# Patient Record
Sex: FEMALE | Race: WHITE | Employment: PART TIME
[De-identification: names, ages, dates, MRNs, and addresses within clinical notes are randomized per-mention and may not be internally consistent; named-entity substitution may affect disease eponyms.]

---
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
## 2019-01-03 ENCOUNTER — TELEPHONE (OUTPATIENT)
Dept: CARDIOLOGY | Age: 19
End: 2019-01-03

## 2019-02-21 PROCEDURE — 93298 REM INTERROG DEV EVAL SCRMS: CPT | Performed by: FAMILY MEDICINE

## 2019-02-27 ENCOUNTER — TELEPHONE (OUTPATIENT)
Dept: CARDIOLOGY | Age: 19
End: 2019-02-27

## 2019-02-28 ENCOUNTER — NURSE ONLY (OUTPATIENT)
Dept: CARDIOLOGY | Age: 19
End: 2019-02-28
Payer: COMMERCIAL

## 2019-02-28 DIAGNOSIS — R00.0 INAPPROPRIATE SINUS TACHYCARDIA: ICD-10-CM

## 2019-02-28 DIAGNOSIS — Z45.09 ENCOUNTER FOR LOOP RECORDER CHECK: Primary | ICD-10-CM

## 2019-03-08 ENCOUNTER — TELEPHONE (OUTPATIENT)
Dept: CARDIOLOGY | Age: 19
End: 2019-03-08

## 2019-05-28 PROCEDURE — 93299 PR REM INTERROG ICPMS/SCRMS <30 D TECH REVIEW: CPT | Performed by: FAMILY MEDICINE

## 2019-05-28 PROCEDURE — 93298 REM INTERROG DEV EVAL SCRMS: CPT | Performed by: FAMILY MEDICINE

## 2019-05-29 ENCOUNTER — TELEPHONE (OUTPATIENT)
Dept: CARDIOLOGY | Age: 19
End: 2019-05-29

## 2019-06-11 ENCOUNTER — NURSE ONLY (OUTPATIENT)
Dept: CARDIOLOGY | Age: 19
End: 2019-06-11
Payer: COMMERCIAL

## 2019-06-11 DIAGNOSIS — I47.20 VENTRICULAR TACHYCARDIA: ICD-10-CM

## 2019-06-11 DIAGNOSIS — Z45.09 ENCOUNTER FOR LOOP RECORDER CHECK: Primary | ICD-10-CM

## 2019-06-26 DIAGNOSIS — N92.0 MENORRHAGIA WITH REGULAR CYCLE: ICD-10-CM

## 2019-07-19 ENCOUNTER — OFFICE VISIT (OUTPATIENT)
Dept: CARDIOLOGY | Age: 19
End: 2019-07-19
Payer: COMMERCIAL

## 2019-07-19 VITALS
WEIGHT: 153 LBS | HEIGHT: 63 IN | HEART RATE: 98 BPM | OXYGEN SATURATION: 97 % | RESPIRATION RATE: 18 BRPM | BODY MASS INDEX: 27.11 KG/M2 | SYSTOLIC BLOOD PRESSURE: 127 MMHG | DIASTOLIC BLOOD PRESSURE: 81 MMHG

## 2019-07-19 DIAGNOSIS — I47.29 PVT (PAROXYSMAL VENTRICULAR TACHYCARDIA): ICD-10-CM

## 2019-07-19 DIAGNOSIS — G90.A POTS (POSTURAL ORTHOSTATIC TACHYCARDIA SYNDROME): Primary | ICD-10-CM

## 2019-07-19 DIAGNOSIS — Z98.890 HISTORY OF LOOP RECORDER: ICD-10-CM

## 2019-07-19 PROCEDURE — 93000 ELECTROCARDIOGRAM COMPLETE: CPT | Performed by: FAMILY MEDICINE

## 2019-07-19 PROCEDURE — 99213 OFFICE O/P EST LOW 20 MIN: CPT | Performed by: FAMILY MEDICINE

## 2019-07-19 NOTE — PROGRESS NOTES
Danii Malcolm am scribing for and in the presence of Rosalva Pollen. Judith Redmond MD, MS, F.A.C.C..    Patient: Benji Steinberg  : 2000  Date of Visit: 2019    REASON FOR VISIT / CONSULTATION: POTS, 1 Year Follow Up (HX: POTS, Tachy, NSVT, Loop. Pt states she is doing good. Denies: CP, Palpitaotns, lighetaded/dizziness, SOB. )    Dear Suad Jimenez MD,    I had the pleasure of seeing your patient Benji Steinberg today in follow up. As you know, Ms. Milena Cormier is a 22 y.o. female with a history of NSVT as well as neurocardiogenic dysfunction with a history of syncope leading to placement of an implantable loop recorder placement on 3/22/16. She also has a history of postural orthostatic tachycardia syndrome (POTS) that had been treated with Toprol XL 25 mg daily. Because of some symptoms of fatigue she decided to go off her the beta-blocker and had been doing fairly well. Ms. Milena Cormier is here for follow up. She states she is doing well at this time. She does report she has had a few possible panic attacks she felt her heart racing, also like she was going to pass out she notices it before a big exam but once she relaxed and calmed down she felt better after 30 minutes or so. She denies any current or recent chest pain, shortness of breath, abdominal pain, diarrhea or bleeding problems. She denies any recent falls or near falls. Past Medical History:   Diagnosis Date    H/O echocardiogram 16    H/O echocardiogram 1/15/16    Moss treadmill score is 15, which correlates with a low risk for CAD. Overall these results are most consistent with a low risk for significant CAD.  Migraines 2001    Usually right before menstural cycle    Neurocardiogenic syncope 2016    Status post placement of implantable loop recorder 3/22/16    LINQ Implant-Medtronic     VT (ventricular tachycardia) ([facility] [address] 2016       CURRENT ALLERGIES: Sulfa antibiotics REVIEW OF SYSTEMS: 14 systems were reviewed.  Pertinent are normal. She exhibits no organomegaly, mass or bruit. Extremities: None. No cyanosis or clubbing. 2+ radial and carotid pulses. Distal extremity pulses: 2+ bilaterally. Neurological: She is alert and oriented to person, place, and time. No evidence of gross cranial nerve deficit. Coordination appeared normal.   Skin: Skin is warm and dry. There is no rash or diaphoresis. Psychiatric: She has a normal mood and affect. Her speech is normal and behavior is normal.      MOST RECENT LABS ON RECORD:   Lab Results   Component Value Date    WBC 7.1 10/04/2016    HGB 14.7 10/04/2016    HCT 42.5 10/04/2016     10/04/2016    ALT 9 09/18/2015    AST 14 09/18/2015     10/04/2016    K 4.6 10/04/2016     10/04/2016    CREATININE 0.73 10/04/2016    BUN 10 10/04/2016    CO2 27 10/04/2016    TSH 1.90 01/05/2016       ASSESSMENT:  1. POTS (postural orthostatic tachycardia syndrome)    2. PVT (paroxysmal ventricular tachycardia) ([facility] [address]    3. History of loop recorder       PLAN:  Postural Orthostatic Tachycardia Syndrome (POTS): Currently only mildly intermittently Symptomatic   · Pharmacological Management: Not indicated at this time. · Nonpharmacologic counseling: Because of her condition, I reminded her to try and keep herself well-hydrated and to take extra time when moving from laying to sitting, sitting to standing and standing to walking and not to avoid salt in her diet. · Additional Testing: No testing intervention needed at this time. Paroxysmal Ventricular Tachycardia: Asymptomatic. No recurrence on her loop recorder since implantation. · Additional Testing: None   · EKG done today in office      Implantable Loop Recorder Implanted 3/22/2016  Indication for Device Placement: History of a wide complex tachycardia as well as recurrent pre-syncope   Interrogation Findings: Relatively frequent intermittent tachycardia but largely asymptomatic and with exercise.  Continue to monitor from home every

## 2019-08-20 PROCEDURE — 93299 PR REM INTERROG ICPMS/SCRMS <30 D TECH REVIEW: CPT | Performed by: FAMILY MEDICINE

## 2019-08-20 PROCEDURE — 93298 REM INTERROG DEV EVAL SCRMS: CPT | Performed by: FAMILY MEDICINE

## 2019-08-22 ENCOUNTER — TELEPHONE (OUTPATIENT)
Dept: CARDIOLOGY | Age: 19
End: 2019-08-22

## 2019-08-23 ENCOUNTER — NURSE ONLY (OUTPATIENT)
Dept: CARDIOLOGY | Age: 19
End: 2019-08-23
Payer: COMMERCIAL

## 2019-08-23 DIAGNOSIS — I47.29 PVT (PAROXYSMAL VENTRICULAR TACHYCARDIA): ICD-10-CM

## 2019-08-23 DIAGNOSIS — Z45.09 ENCOUNTER FOR LOOP RECORDER CHECK: ICD-10-CM

## 2019-11-14 ENCOUNTER — TELEPHONE (OUTPATIENT)
Dept: CARDIOLOGY | Age: 19
End: 2019-11-14

## 2020-01-26 RX ORDER — ONDANSETRON 4 MG/1
4 TABLET, ORALLY DISINTEGRATING ORAL 3 TIMES DAILY PRN
Qty: 15 TABLET | Refills: 0 | Status: SHIPPED | OUTPATIENT
Start: 2020-01-26 | End: 2022-05-09

## 2020-01-27 ENCOUNTER — APPOINTMENT (OUTPATIENT)
Dept: GENERAL RADIOLOGY | Age: 20
End: 2020-01-27
Attending: FAMILY MEDICINE
Payer: COMMERCIAL

## 2020-01-27 ENCOUNTER — HOSPITAL ENCOUNTER (OUTPATIENT)
Age: 20
Setting detail: OBSERVATION
Discharge: HOME | End: 2020-01-28
Attending: FAMILY MEDICINE | Admitting: FAMILY MEDICINE
Payer: COMMERCIAL

## 2020-01-27 PROBLEM — E86.0 DEHYDRATION: Status: ACTIVE | Noted: 2020-01-27

## 2020-01-27 PROCEDURE — G0378 HOSPITAL OBSERVATION PER HR: HCPCS

## 2020-01-27 PROCEDURE — 87040 BLOOD CULTURE FOR BACTERIA: CPT

## 2020-01-27 PROCEDURE — 81001 URINALYSIS AUTO W/SCOPE: CPT

## 2020-01-27 PROCEDURE — 6370000000 HC RX 637 (ALT 250 FOR IP): Performed by: FAMILY MEDICINE

## 2020-01-27 PROCEDURE — 96360 HYDRATION IV INFUSION INIT: CPT

## 2020-01-27 PROCEDURE — 93005 ELECTROCARDIOGRAM TRACING: CPT | Performed by: FAMILY MEDICINE

## 2020-01-27 PROCEDURE — 2580000003 HC RX 258: Performed by: FAMILY MEDICINE

## 2020-01-27 PROCEDURE — G0379 DIRECT REFER HOSPITAL OBSERV: HCPCS

## 2020-01-27 PROCEDURE — 71046 X-RAY EXAM CHEST 2 VIEWS: CPT

## 2020-01-27 PROCEDURE — 36415 COLL VENOUS BLD VENIPUNCTURE: CPT

## 2020-01-27 RX ORDER — SODIUM CHLORIDE 0.9 % (FLUSH) 0.9 %
10 SYRINGE (ML) INJECTION PRN
Status: DISCONTINUED | OUTPATIENT
Start: 2020-01-27 | End: 2020-01-28 | Stop reason: HOSPADM

## 2020-01-27 RX ORDER — ACETAMINOPHEN 325 MG/1
650 TABLET ORAL EVERY 4 HOURS PRN
Status: DISCONTINUED | OUTPATIENT
Start: 2020-01-27 | End: 2020-01-28 | Stop reason: HOSPADM

## 2020-01-27 RX ORDER — SODIUM CHLORIDE 9 MG/ML
INJECTION, SOLUTION INTRAVENOUS CONTINUOUS
Status: DISCONTINUED | OUTPATIENT
Start: 2020-01-27 | End: 2020-01-28 | Stop reason: HOSPADM

## 2020-01-27 RX ORDER — ONDANSETRON 2 MG/ML
4 INJECTION INTRAMUSCULAR; INTRAVENOUS EVERY 6 HOURS PRN
Status: DISCONTINUED | OUTPATIENT
Start: 2020-01-27 | End: 2020-01-28 | Stop reason: HOSPADM

## 2020-01-27 RX ORDER — FAMOTIDINE 10 MG
20 TABLET ORAL 2 TIMES DAILY
Status: DISCONTINUED | OUTPATIENT
Start: 2020-01-27 | End: 2020-01-28 | Stop reason: HOSPADM

## 2020-01-27 RX ORDER — SODIUM CHLORIDE 0.9 % (FLUSH) 0.9 %
10 SYRINGE (ML) INJECTION EVERY 12 HOURS SCHEDULED
Status: DISCONTINUED | OUTPATIENT
Start: 2020-01-27 | End: 2020-01-28 | Stop reason: HOSPADM

## 2020-01-27 RX ADMIN — SODIUM CHLORIDE: 9 INJECTION, SOLUTION INTRAVENOUS at 23:13

## 2020-01-27 RX ADMIN — ACETAMINOPHEN 650 MG: 325 TABLET, FILM COATED ORAL at 22:54

## 2020-01-27 RX ADMIN — BENZOCAINE AND MENTHOL 1 LOZENGE: 15; 3.6 LOZENGE ORAL at 22:54

## 2020-01-27 RX ADMIN — FAMOTIDINE 20 MG: 10 TABLET ORAL at 22:54

## 2020-01-27 ASSESSMENT — PAIN SCALES - GENERAL
PAINLEVEL_OUTOF10: 5
PAINLEVEL_OUTOF10: 5
PAINLEVEL_OUTOF10: 4

## 2020-01-28 VITALS
DIASTOLIC BLOOD PRESSURE: 69 MMHG | SYSTOLIC BLOOD PRESSURE: 124 MMHG | WEIGHT: 147.7 LBS | OXYGEN SATURATION: 96 % | HEART RATE: 102 BPM | RESPIRATION RATE: 16 BRPM | BODY MASS INDEX: 26.17 KG/M2 | TEMPERATURE: 99.1 F | HEIGHT: 63 IN

## 2020-01-28 PROBLEM — J10.1 INFLUENZA A: Status: ACTIVE | Noted: 2020-01-28

## 2020-01-28 LAB
-: ABNORMAL
ALBUMIN SERPL-MCNC: 3.5 G/DL (ref 3.5–5.2)
ALBUMIN SERPL-MCNC: 4.4 G/DL (ref 3.5–5.2)
ALBUMIN/GLOBULIN RATIO: 1.1 (ref 1–2.5)
ALBUMIN/GLOBULIN RATIO: 1.2 (ref 1–2.5)
ALP BLD-CCNC: 53 U/L (ref 35–104)
ALP BLD-CCNC: 68 U/L (ref 35–104)
ALT SERPL-CCNC: 12 U/L (ref 5–33)
ALT SERPL-CCNC: 8 U/L (ref 5–33)
AMORPHOUS: ABNORMAL
ANION GAP SERPL CALCULATED.3IONS-SCNC: 14 MMOL/L (ref 9–17)
ANION GAP SERPL CALCULATED.3IONS-SCNC: 16 MMOL/L (ref 9–17)
AST SERPL-CCNC: 14 U/L
AST SERPL-CCNC: 25 U/L
BACTERIA: ABNORMAL
BILIRUB SERPL-MCNC: 0.26 MG/DL (ref 0.3–1.2)
BILIRUB SERPL-MCNC: 0.29 MG/DL (ref 0.3–1.2)
BILIRUBIN URINE: NEGATIVE
BUN BLDV-MCNC: 7 MG/DL (ref 6–20)
BUN BLDV-MCNC: 9 MG/DL (ref 6–20)
BUN/CREAT BLD: 11 (ref 9–20)
BUN/CREAT BLD: 12 (ref 9–20)
CALCIUM SERPL-MCNC: 8.3 MG/DL (ref 8.6–10.4)
CALCIUM SERPL-MCNC: 9.5 MG/DL (ref 8.6–10.4)
CASTS UA: ABNORMAL /LPF
CHLORIDE BLD-SCNC: 104 MMOL/L (ref 98–107)
CHLORIDE BLD-SCNC: 95 MMOL/L (ref 98–107)
CO2: 21 MMOL/L (ref 20–31)
CO2: 22 MMOL/L (ref 20–31)
COLOR: YELLOW
COMMENT UA: ABNORMAL
CREAT SERPL-MCNC: 0.66 MG/DL (ref 0.5–0.9)
CREAT SERPL-MCNC: 0.78 MG/DL (ref 0.5–0.9)
CRYSTALS, UA: ABNORMAL /HPF
DIRECT EXAM: ABNORMAL
DIRECT EXAM: ABNORMAL
EKG ATRIAL RATE: 126 BPM
EKG P AXIS: 54 DEGREES
EKG P-R INTERVAL: 122 MS
EKG Q-T INTERVAL: 290 MS
EKG QRS DURATION: 82 MS
EKG QTC CALCULATION (BAZETT): 420 MS
EKG R AXIS: 101 DEGREES
EKG T AXIS: 31 DEGREES
EKG VENTRICULAR RATE: 126 BPM
EPITHELIAL CELLS UA: ABNORMAL /HPF (ref 0–25)
GFR AFRICAN AMERICAN: >60 ML/MIN
GFR AFRICAN AMERICAN: >60 ML/MIN
GFR NON-AFRICAN AMERICAN: >60 ML/MIN
GFR NON-AFRICAN AMERICAN: >60 ML/MIN
GFR SERPL CREATININE-BSD FRML MDRD: ABNORMAL ML/MIN/{1.73_M2}
GLUCOSE BLD-MCNC: 110 MG/DL (ref 70–99)
GLUCOSE BLD-MCNC: 94 MG/DL (ref 70–99)
GLUCOSE URINE: NEGATIVE
HCT VFR BLD CALC: 34.9 % (ref 36.3–47.1)
HCT VFR BLD CALC: 39.8 % (ref 36.3–47.1)
HEMOGLOBIN: 11.4 G/DL (ref 11.9–15.1)
HEMOGLOBIN: 13.3 G/DL (ref 11.9–15.1)
KETONES, URINE: ABNORMAL
LEUKOCYTE ESTERASE, URINE: NEGATIVE
Lab: ABNORMAL
MCH RBC QN AUTO: 29.5 PG (ref 25.2–33.5)
MCH RBC QN AUTO: 29.7 PG (ref 25.2–33.5)
MCHC RBC AUTO-ENTMCNC: 32.7 G/DL (ref 28.4–34.8)
MCHC RBC AUTO-ENTMCNC: 33.4 G/DL (ref 28.4–34.8)
MCV RBC AUTO: 88.8 FL (ref 82.6–102.9)
MCV RBC AUTO: 90.4 FL (ref 82.6–102.9)
MUCUS: ABNORMAL
NITRITE, URINE: NEGATIVE
NRBC AUTOMATED: 0 PER 100 WBC
NRBC AUTOMATED: 0 PER 100 WBC
OTHER OBSERVATIONS UA: ABNORMAL
PDW BLD-RTO: 13 % (ref 11.8–14.4)
PDW BLD-RTO: 13.1 % (ref 11.8–14.4)
PH UA: 6.5 (ref 5–9)
PLATELET # BLD: 188 K/UL (ref 138–453)
PLATELET # BLD: 232 K/UL (ref 138–453)
PMV BLD AUTO: 11.2 FL (ref 8.1–13.5)
PMV BLD AUTO: 11.8 FL (ref 8.1–13.5)
POTASSIUM SERPL-SCNC: 3.6 MMOL/L (ref 3.7–5.3)
POTASSIUM SERPL-SCNC: 3.9 MMOL/L (ref 3.7–5.3)
PROTEIN UA: ABNORMAL
RBC # BLD: 3.86 M/UL (ref 3.95–5.11)
RBC # BLD: 4.48 M/UL (ref 3.95–5.11)
RBC UA: ABNORMAL /HPF (ref 0–2)
RENAL EPITHELIAL, UA: ABNORMAL /HPF
SODIUM BLD-SCNC: 133 MMOL/L (ref 135–144)
SODIUM BLD-SCNC: 139 MMOL/L (ref 135–144)
SPECIFIC GRAVITY UA: 1.01 (ref 1.01–1.02)
SPECIMEN DESCRIPTION: ABNORMAL
TOTAL PROTEIN: 6.6 G/DL (ref 6.4–8.3)
TOTAL PROTEIN: 8.1 G/DL (ref 6.4–8.3)
TRICHOMONAS: ABNORMAL
TURBIDITY: CLEAR
URINE HGB: ABNORMAL
UROBILINOGEN, URINE: NORMAL
WBC # BLD: 12.3 K/UL (ref 4.5–13.5)
WBC # BLD: 7.8 K/UL (ref 4.5–13.5)
WBC UA: ABNORMAL /HPF (ref 0–5)
YEAST: ABNORMAL

## 2020-01-28 PROCEDURE — 87804 INFLUENZA ASSAY W/OPTIC: CPT

## 2020-01-28 PROCEDURE — 85027 COMPLETE CBC AUTOMATED: CPT

## 2020-01-28 PROCEDURE — 2580000003 HC RX 258: Performed by: FAMILY MEDICINE

## 2020-01-28 PROCEDURE — 96361 HYDRATE IV INFUSION ADD-ON: CPT

## 2020-01-28 PROCEDURE — 80053 COMPREHEN METABOLIC PANEL: CPT

## 2020-01-28 PROCEDURE — 36415 COLL VENOUS BLD VENIPUNCTURE: CPT

## 2020-01-28 PROCEDURE — 99219 PR INITIAL OBSERVATION CARE/DAY 50 MINUTES: CPT | Performed by: FAMILY MEDICINE

## 2020-01-28 PROCEDURE — 93010 ELECTROCARDIOGRAM REPORT: CPT | Performed by: INTERNAL MEDICINE

## 2020-01-28 PROCEDURE — G0378 HOSPITAL OBSERVATION PER HR: HCPCS

## 2020-01-28 PROCEDURE — 6370000000 HC RX 637 (ALT 250 FOR IP): Performed by: FAMILY MEDICINE

## 2020-01-28 RX ORDER — OSELTAMIVIR PHOSPHATE 75 MG/1
75 CAPSULE ORAL 2 TIMES DAILY
Status: DISCONTINUED | OUTPATIENT
Start: 2020-01-28 | End: 2020-01-28 | Stop reason: HOSPADM

## 2020-01-28 RX ORDER — OSELTAMIVIR PHOSPHATE 75 MG/1
75 CAPSULE ORAL 2 TIMES DAILY
Qty: 20 CAPSULE | Refills: 0 | Status: SHIPPED | OUTPATIENT
Start: 2020-01-28 | End: 2020-02-07

## 2020-01-28 RX ADMIN — ACETAMINOPHEN 650 MG: 325 TABLET, FILM COATED ORAL at 02:30

## 2020-01-28 RX ADMIN — SODIUM CHLORIDE: 9 INJECTION, SOLUTION INTRAVENOUS at 06:26

## 2020-01-28 RX ADMIN — FAMOTIDINE 20 MG: 10 TABLET ORAL at 08:46

## 2020-01-28 RX ADMIN — OSELTAMIVIR PHOSPHATE 75 MG: 75 CAPSULE ORAL at 08:59

## 2020-01-28 RX ADMIN — BENZOCAINE AND MENTHOL 1 LOZENGE: 15; 3.6 LOZENGE ORAL at 02:33

## 2020-01-28 RX ADMIN — ACETAMINOPHEN 650 MG: 325 TABLET, FILM COATED ORAL at 08:46

## 2020-01-28 RX ADMIN — SODIUM CHLORIDE: 9 INJECTION, SOLUTION INTRAVENOUS at 02:26

## 2020-01-28 RX ADMIN — ACETAMINOPHEN 650 MG: 325 TABLET, FILM COATED ORAL at 12:50

## 2020-01-28 RX ADMIN — SODIUM CHLORIDE: 9 INJECTION, SOLUTION INTRAVENOUS at 10:33

## 2020-01-28 ASSESSMENT — PAIN SCALES - GENERAL
PAINLEVEL_OUTOF10: 1
PAINLEVEL_OUTOF10: 3
PAINLEVEL_OUTOF10: 2
PAINLEVEL_OUTOF10: 3
PAINLEVEL_OUTOF10: 0
PAINLEVEL_OUTOF10: 0

## 2020-01-28 NOTE — DISCHARGE INSTR - DIET

## 2020-01-28 NOTE — PROGRESS NOTES
Discharge instructions given to pt and mother. No questions, pt verbalized understanding all instructions. Pt aware to make follow up appointment as needed. Pt d/c'd off unit at this time, ambulatory with mother to home. Belongings in hand. No issues noted.

## 2020-01-28 NOTE — PROGRESS NOTES
Dr. Elke Guzman contacted for new orders and made aware of MEW, new orders received, blood cultues and blood draw preformed, IV fluids started, medication administered as ordered, pt assessment preformed, oriented to room

## 2020-01-28 NOTE — PROGRESS NOTES
Call placed to Dr. Amina Barajas office to verify when pt may return to college. Spoke with Nelsy Frausto, she informs that Dr. Neeraj Norton is okay with pt returning to college when she feels able but must wear a mask until her fevers are completely resolved. Pt made aware. Pt again ask if she would like to receive Flu Vacc and she states \"no, I don't think so. \"

## 2020-01-28 NOTE — H&P
Hospital Medicine  History and Physical    Patient:  Sana Guzman  MRN: [id number]    Chief Complaint:  Vomiting and cough    History Obtained From:  patient, mother  PCP: Boris Ruiz MD    History of Present Illness: The patient is a 20 y.o. 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 Overall these results are most consistent with a low risk for significant CAD.  Migraines 2001    Usually right before menstural cycle    Neurocardiogenic syncope 1/01/2016    Status post placement of implantable loop recorder 3/22/16    LINQ Implant-Medtronic     VT (ventricular tachycardia) ([facility] [address] 1/01/2016       Past Surgical History:        Procedure Laterality Date    INSERTABLE CARDIAC MONITOR Left 3/10/16    [id number]/ Dr. Carolann Salomon  03/22/2016    Replacement of LINQ loop recorder secondary to trumatic injury/Dr. Valladares       Medications Prior to Admission:    Prior to Admission medications    Medication Sig Start Date End Date Taking?  Authorizing Provider   ondansetron (ZOFRAN-ODT) 4 MG disintegrating tablet Take 1 tablet by mouth 3 times daily as needed for Nausea or Vomiting 1/26/20  Yes Boris Ruiz MD   SPRINTEC 28 0.25-35 MG-MCG per tablet TAKE 1 TABLET BY MOUTH ONCE DAILY faint wheeze  good air movement  ABD:    Bowels sounds normal.  Abdomen is soft. No distention. No tenderness. EXT:   no edema bilaterally . No calf tenderness. NEURO: Motor and sensory are intact  SKIN:  No rashes. No skin lesions. PSYCH:  Normal mood and affect  -----------------------------------------------------------------  Diagnostic Data:   · All diagnostic data was reviewed    Assessment:         Active Problems:    Dehydration  Resolved Problems:    * No resolved hospital problems.  *      Plan:     · This patient requires overnight observation because of likely influenza  Dehydration and tachycardia  · Factors affecting the medical complexity of this patient include past history of v tach    · Estimated length of stay is 1 days  · Will swab for influenza  She has received 2 liters fluid thus far  Lab is not revealing  cxr is clear  Urine is clear  Clear liquids  Discharge later today    Barbara Manriquez MD

## 2020-01-28 NOTE — PROGRESS NOTES
Met with mother to discuss Patient discharge plan. Patient is a 20year old single, white female, admitted with diagnosis of Dehydration. She is noted to be alert and oriented, due to be discharged later today. Patient is a college student at [employer]. She uses no DME and has no outside services or community resources currently in place. Patient is independent with ADL's. PCP is Dr. Dayana Burnett. Patient is still under her father's medical insurance and mother also assists with the out of pocket cost for her prescriptions. Discharge plan is home when stable. Patient has no medical directives currently in place. No anticipated needs or discharge concerns noted by Patient's mother at this time. Will assist with needs as appropriate.     [address]  1/28/2020

## 2020-01-29 NOTE — DISCHARGE SUMMARY
Discharge Summary    Shannan Miranda  :  2000  MRN:  [id number]    Admit date:  2020      Discharge date: 2020     Admitting Physician:  Pratibha Sanders MD    Consultants:  none    Procedures: none    Complications: none    Discharge Condition: fair    Hospital Course:   Shannan Miranda is a 20 y.o. female admitted with vomiting and dehydration  She was noted to have influenza A on admission testing  She recovered with hydration and able to tolerate liquids  She was started on tamiflu  She was stable for discharge     Exam:  GEN:  alert and oriented to person, place and time, well-developed and well-nourished, in no acute distress  EYES: No gross abnormalities.   NECK: normal,  no carotid bruits  PULM: clear to auscultation bilaterally- no wheezes, rales or rhonchi, normal air movement, no respiratory distress  COR: regular rate & rhythm  ABD:  soft, non-tender, non-distended, normal bowel sounds, no masses or organomegaly  EXT:   no cyanosis, clubbing or edema present    NEURO: negative, follows commands, MCFADDEN, no deficits  SKIN:  no rashes or significant lesions    Significant Diagnostic Studies:   Lab Results   Component Value Date    WBC 7.8 2020    HGB 11.4 (L) 2020     2020       Lab Results   Component Value Date    BUN 7 2020    CREATININE 0.66 2020     2020    K 3.6 (L) 2020    CALCIUM 8.3 (L) 2020     2020    CO2 21 2020    LABGLOM >60 2020       Lab Results   Component Value Date    WBCUA 0 TO 2 2020    RBCUA 0 TO 2 2020    EPITHUA 2 TO 5 2020    LEUKOCYTESUR NEGATIVE 2020    SPECGRAV 1.010 2020    GLUCOSEU NEGATIVE 2020    KETUA 3+ (A) 2020    PROTEINU TRACE (A) 2020    HGBUR TRACE (A) 2020    CASTUA NOT REPORTED 2020    CRYSTUA NOT REPORTED 2020    BACTERIA TRACE (A) 2020    YEAST NOT REPORTED 2020       Xr Chest Standard (2

## 2020-02-01 LAB
CULTURE: NORMAL
CULTURE: NORMAL
Lab: NORMAL
Lab: NORMAL
SPECIMEN DESCRIPTION: NORMAL
SPECIMEN DESCRIPTION: NORMAL

## 2020-02-11 RX ORDER — BENZONATATE 100 MG/1
100 CAPSULE ORAL 2 TIMES DAILY PRN
Qty: 20 CAPSULE | Refills: 0 | Status: SHIPPED | OUTPATIENT
Start: 2020-02-11 | End: 2020-02-18

## 2020-02-11 RX ORDER — METHYLPREDNISOLONE 4 MG/1
TABLET ORAL
Qty: 1 KIT | Refills: 0 | Status: SHIPPED | OUTPATIENT
Start: 2020-02-11 | End: 2020-02-17

## 2020-02-26 PROBLEM — E86.0 DEHYDRATION: Status: RESOLVED | Noted: 2020-01-27 | Resolved: 2020-02-26

## 2020-02-27 PROBLEM — J10.1 INFLUENZA A: Status: RESOLVED | Noted: 2020-01-28 | Resolved: 2020-02-27

## 2020-06-29 RX ORDER — NORGESTIMATE AND ETHINYL ESTRADIOL 0.25-0.035
KIT ORAL
Qty: 28 TABLET | Refills: 12 | Status: SHIPPED | OUTPATIENT
Start: 2020-06-29 | End: 2021-05-06

## 2020-07-14 ENCOUNTER — OFFICE VISIT (OUTPATIENT)
Dept: OBGYN | Age: 20
End: 2020-07-14
Payer: COMMERCIAL

## 2020-07-14 VITALS
HEIGHT: 63 IN | BODY MASS INDEX: 27.46 KG/M2 | DIASTOLIC BLOOD PRESSURE: 80 MMHG | SYSTOLIC BLOOD PRESSURE: 122 MMHG | WEIGHT: 155 LBS

## 2020-07-14 PROCEDURE — 99212 OFFICE O/P EST SF 10 MIN: CPT | Performed by: ADVANCED PRACTICE MIDWIFE

## 2020-07-14 NOTE — PROGRESS NOTES
PROBLEM VISIT     Date of service: 2020    Kevin Quezada  Is a 20 y.o. single female    PT's PCP is: Natalia Grant MD     : 2000                                             Subjective:       Patient's last menstrual period was 2020 (approximate). OB History    Para Term  AB Living   0 0 0 0 0 0   SAB TAB Ectopic Molar Multiple Live Births   0 0 0 0 0 0        Social History     Tobacco Use   Smoking Status Never Smoker   Smokeless Tobacco Never Used        Social History     Substance and Sexual Activity   Alcohol Use No    Alcohol/week: 0.0 standard drinks       Social History     Substance and Sexual Activity   Sexual Activity Yes    Partners: Male    Birth control/protection: Pill    Comment: pill, condoms       Allergies: Sulfa antibiotics    Chief Complaint   Patient presents with    Menorrhagia     Med check. Sprintec. pt states this has already been refilled . she likes this medication. pt states no issues or concerns. pt declines std testing        Last Yearly:      Last pap: n/a    Last HPV: n/a    PE:  Vital Signs  Blood pressure 122/80, height 5' 3\" (1.6 m), weight 155 lb (70.3 kg), last menstrual period 2020, not currently breastfeeding. NURSE: WILLIAM    HPI: Patient doing well today. Patient denies needs or concerns at this point in time and states that her medication is working well for her periods. Patient states she does have some spotting prior to the period but this does not affect her or bother her    Yes  PT denies fever, chills, nausea and vomiting       Assessment and Plan          Diagnosis Orders   1. Menorrhagia with regular cycle               I am having Ermelinda Handley maintain her ondansetron, benzocaine-menthol, and norgestimate-ethinyl estradiol. Return in about 3 months (around 10/19/2020) for yearly.     Patient Instructions     Patient Education        Pap Test: Care Instructions  Your Care Instructions     The Pap test (also called a Pap smear) is a screening test for cancer of the cervix, which is the lower part of the uterus that opens into the vagina. The test can help your doctor find early changes in the cells that could lead to cancer. The sample of cells taken during your test has been sent to a lab so that an expert can look at the cells. It usually takes a week or two to get the results back. Follow-up care is a key part of your treatment and safety. Be sure to make and go to all appointments, and call your doctor if you are having problems. It's also a good idea to know your test results and keep a list of the medicines you take. What do the results mean? · A normal result means that the test did not find any abnormal cells in the sample. · An abnormal result can mean many things. Most of these are not cancer. The results of your test may be abnormal because:  ? You have an infection of the vagina or cervix, such as a yeast infection. ? You have an IUD (intrauterine device for birth control). ? You have low estrogen levels after menopause that are causing the cells to change. ? You have cell changes that may be a sign of precancer or cancer. The results are ranked based on how serious the changes might be. There are many other reasons why you might not get a normal result. If the results were abnormal, you may need to get another test within a few weeks or months. If the results show changes that could be a sign of cancer, you may need a test called a colposcopy, which provides a more complete view of the cervix. Sometimes the lab cannot use the sample because it does not contain enough cells or was not preserved well. If so, you may need to have the test again. This is not common, but it does happen from time to time. When should you call for help?   Watch closely for changes in your health, and be sure to contact your doctor if:  · You have vaginal bleeding or pain for more than 2 days after the test. It is normal to have a small amount of bleeding for a day or two after the test.  Where can you learn more? Go to [email or web address] and sign in to your [employer] account. Enter R246 in the [facility] box to learn more about \"Pap Test: Care Instructions. \"     If you do not have an account, please click on the \"Sign Up Now\" link. Current as of: August 22, 2019               Content Version: 12.5  © 4971-4579 [employer]. Care instructions adapted under license by [facility] If you have questions about a medical condition or this instruction, always ask your healthcare professional. [place] [address] any warranty or liability for your use of this information. Patient Education        Well Visit, Ages 25 to 48: Care Instructions  Your Care Instructions     Physical exams can help you stay healthy. Your doctor has checked your overall health and may have suggested ways to take good care of yourself. He or she also may have recommended tests. At home, you can help prevent illness with healthy eating, regular exercise, and other steps. Follow-up care is a key part of your treatment and safety. Be sure to make and go to all appointments, and call your doctor if you are having problems. It's also a good idea to know your test results and keep a list of the medicines you take. How can you care for yourself at home? · Reach and stay at a healthy weight. This will lower your risk for many problems, such as obesity, diabetes, heart disease, and high blood pressure. · Get at least 30 minutes of physical activity on most days of the week. Walking is a good choice. You also may want to do other activities, such as running, swimming, cycling, or playing tennis or team sports. Discuss any changes in your exercise program with your doctor. · Do not smoke or allow others to smoke around you.  If you need help quitting, talk to your doctor about stop-smoking when you should have a clinical breast exam and a mammogram. Medical experts differ on whether and how often women under 50 should have these tests. Your doctor can help you decide what is right for you. · Cervical cancer screening test and pelvic exam. Begin with a Pap test at age 24. The test often is part of a pelvic exam. Starting at age 27, you may choose to have a Pap test, an HPV test, or both tests at the same time (called co-testing). Talk with your doctor about how often to have testing. · Tests for sexually transmitted infections (STIs). Ask whether you should have tests for STIs. You may be at risk if you have sex with more than one person, especially if your partners do not wear condoms. For men  · Tests for sexually transmitted infections (STIs). Ask whether you should have tests for STIs. You may be at risk if you have sex with more than one person, especially if you do not wear a condom. · Testicular cancer exam. Ask your doctor whether you should check your testicles regularly. · Prostate exam. Talk to your doctor about whether you should have a blood test (called a PSA test) for prostate cancer. Experts differ on whether and when men should have this test. Some experts suggest it if you are older than 39 and are -American or have a father or brother who got prostate cancer when he was younger than 72. When should you call for help? Watch closely for changes in your health, and be sure to contact your doctor if you have any problems or symptoms that concern you. Where can you learn more? Go to [email or web address] and sign in to your [employer] account. Enter P072 in the [facility] box to learn more about \"Well Visit, Ages 25 to 48: Care Instructions. \"     If you do not have an account, please click on the \"Sign Up Now\" link. Current as of: August 22, 2019               Content Version: 12.5  © 8350-2076 [employer].    Care instructions

## 2020-07-14 NOTE — PATIENT INSTRUCTIONS
Patient Education        Pap Test: Care Instructions  Your Care Instructions     The Pap test (also called a Pap smear) is a screening test for cancer of the cervix, which is the lower part of the uterus that opens into the vagina. The test can help your doctor find early changes in the cells that could lead to cancer. The sample of cells taken during your test has been sent to a lab so that an expert can look at the cells. It usually takes a week or two to get the results back. Follow-up care is a key part of your treatment and safety. Be sure to make and go to all appointments, and call your doctor if you are having problems. It's also a good idea to know your test results and keep a list of the medicines you take. What do the results mean? · A normal result means that the test did not find any abnormal cells in the sample. · An abnormal result can mean many things. Most of these are not cancer. The results of your test may be abnormal because:  ? You have an infection of the vagina or cervix, such as a yeast infection. ? You have an IUD (intrauterine device for birth control). ? You have low estrogen levels after menopause that are causing the cells to change. ? You have cell changes that may be a sign of precancer or cancer. The results are ranked based on how serious the changes might be. There are many other reasons why you might not get a normal result. If the results were abnormal, you may need to get another test within a few weeks or months. If the results show changes that could be a sign of cancer, you may need a test called a colposcopy, which provides a more complete view of the cervix. Sometimes the lab cannot use the sample because it does not contain enough cells or was not preserved well. If so, you may need to have the test again. This is not common, but it does happen from time to time. When should you call for help?   Watch closely for changes in your health, and be sure to contact others to smoke around you. If you need help quitting, talk to your doctor about stop-smoking programs and medicines. These can increase your chances of quitting for good. · Talk to your doctor about whether you have any risk factors for sexually transmitted infections (STIs). Having one sex partner (who does not have STIs and does not have sex with anyone else) is a good way to avoid these infections. · Use birth control if you do not want to have children at this time. Talk with your doctor about the choices available and what might be best for you. · Protect your skin from too much sun. When you're outdoors from 10 a.m. to 4 p.m., stay in the shade or cover up with clothing and a hat with a wide brim. Wear sunglasses that block UV rays. Even when it's cloudy, put broad-spectrum sunscreen (SPF 30 or higher) on any exposed skin. · See a dentist one or two times a year for checkups and to have your teeth cleaned. · Wear a seat belt in the car. Follow your doctor's advice about when to have certain tests. These tests can spot problems early. For everyone  · Cholesterol. Have the fat (cholesterol) in your blood tested after age 21. Your doctor will tell you how often to have this done based on your age, family history, or other things that can increase your risk for heart disease. · Blood pressure. Have your blood pressure checked during a routine doctor visit. Your doctor will tell you how often to check your blood pressure based on your age, your blood pressure results, and other factors. · Vision. Talk with your doctor about how often to have a glaucoma test.  · Diabetes. Ask your doctor whether you should have tests for diabetes. · Colon cancer. Your risk for colorectal cancer gets higher as you get older. Some experts say that adults should start regular screening at age 48 and stop at age 76. Others say to start before age 48 or continue after age 76.  Talk with your doctor about your risk and when to

## 2020-07-23 ENCOUNTER — OFFICE VISIT (OUTPATIENT)
Dept: CARDIOLOGY | Age: 20
End: 2020-07-23
Payer: COMMERCIAL

## 2020-07-23 VITALS
SYSTOLIC BLOOD PRESSURE: 126 MMHG | DIASTOLIC BLOOD PRESSURE: 77 MMHG | WEIGHT: 157 LBS | HEART RATE: 90 BPM | BODY MASS INDEX: 27.82 KG/M2 | HEIGHT: 63 IN

## 2020-07-23 PROCEDURE — 99213 OFFICE O/P EST LOW 20 MIN: CPT | Performed by: FAMILY MEDICINE

## 2020-07-23 NOTE — PATIENT INSTRUCTIONS
SURVEY:    You may be receiving a survey from [employer] regarding your visit today. Please complete the survey to enable us to provide the highest quality of care to you and your family. If you cannot score us a very good on any question, please call the office to discuss how we could have made your experience a very good one. Thank you.

## 2020-07-23 NOTE — PROGRESS NOTES
Jacob Shepherd CMA am scribing for and in the presence of Lydia Coughlin. Yin Barnes MD, MS, F.A.C.C..    Patient: Karina Rice  : 2000  Date of Visit: 2020    REASON FOR VISIT / CONSULTATION: POTS, 1 Year Follow Up (Hx:POTS,PVT,Loop. She has been doing pretty good. Working out will have SOB, lightheaded/dizziness and palpitations. . Denies: CP.)    Dear Cecilia Todd MD,    I had the pleasure of seeing your patient Karina Rice today in follow up. As you know, Ms. Arabella Taylor is a 20 y.o. female with a history of NSVT as well as neurocardiogenic dysfunction with a history of syncope leading to placement of an implantable loop recorder placement on 3/22/16. She also has a history of postural orthostatic tachycardia syndrome (POTS) that had been treated with Toprol XL 25 mg daily. Because of some symptoms of fatigue she decided to go off her the beta-blocker and had been doing fairly well. Since the last time I saw Ms. Handley she reports she has been doing well. The only time she has shortness of breath, lightheaded/dizziness or palpitations - is when she is working out at [email or web address]. She records her heart rate while at the gym and it gets in the 200's. When she goes about her normal day, she does not experience any symptoms. She denies any current or recent chest pain, shortness of breath, abdominal pain, diarrhea or bleeding problems. She denies any recent falls or near falls. Past Medical History:   Diagnosis Date    H/O echocardiogram 16    H/O echocardiogram 1/15/16    Moss treadmill score is 15, which correlates with a low risk for CAD. Overall these results are most consistent with a low risk for significant CAD.      Migraines     Usually right before menstural cycle    Neurocardiogenic syncope 2016    Status post placement of implantable loop recorder 3/22/16    LINQ Implant-[employer]     VT (ventricular tachycardia) ([facility] [address] 2016       CURRENT ALLERGIES: Sulfa antibiotics REVIEW OF SYSTEMS: 14 systems were reviewed. Pertinent positives and negatives as above, all else negative. Past Surgical History:   Procedure Laterality Date    INSERTABLE CARDIAC MONITOR Left 3/10/16    [facility]/ Dr. Gama Manual  03/22/2016    Replacement of LINQ loop recorder secondary to trumatic injury/Dr. Valladares    Social History:  Social History     Tobacco Use    Smoking status: Never Smoker    Smokeless tobacco: Never Used   Substance Use Topics    Alcohol use: No     Alcohol/week: 0.0 standard drinks    Drug use: No        CURRENT MEDICATIONS:  Outpatient Medications Marked as Taking for the 7/23/20 encounter (Office Visit) with Jigar Mark MD   Medication Sig Dispense Refill    norgestimate-ethinyl estradiol ([address] [id number]) 0.25-35 MG-MCG per tablet TAKE 1 TABLET BY MOUTH ONCE DAILY 28 tablet 12       FAMILY HISTORY: family history includes Cancer in her maternal grandfather; Diabetes in her maternal grandmother; High Blood Pressure in her father and paternal grandfather; High Cholesterol in her maternal grandfather; Other in her maternal grandfather and maternal grandmother. PHYSICAL EXAM:   /77 (Site: Left Upper Arm, Position: Sitting, Cuff Size: Medium Adult)   Pulse 90   Ht 5' 3\" (1.6 m)   Wt 157 lb (71.2 kg)   LMP 07/04/2020 (Approximate)   HC 16 cm (6.3\")   BMI 27.81 kg/m²  Body mass index is 27.81 kg/m². Constitutional: She is oriented to person, place, and time. She appears well-developed and well-nourished. In no acute distress. HEENT: Normocephalic and atraumatic. No JVD present. Carotid bruit is not present. No mass and no thyromegaly present. No lymphadenopathy present. Cardiovascular: Tachycardic rate, regular rhythm, normal heart sounds. Exam reveals no gallop and no friction rubs. 1/6 systolic murmur, 5th intercostal space on the LEFT in the mid-clavicular line (cardiac apex).   Pulmonary/Chest: Effort normal and breath sounds normal. No respiratory distress. She has no wheezes, rhonchi or rales. Abdominal: Soft, non-tender. Bowel sounds and aorta are normal. She exhibits no organomegaly, mass or bruit. Extremities: None. No cyanosis or clubbing. 2+ radial and carotid pulses. Distal extremity pulses: 2+ bilaterally. Neurological: She is alert and oriented to person, place, and time. No evidence of gross cranial nerve deficit. Coordination appeared normal.   Skin: Skin is warm and dry. There is no rash or diaphoresis. Psychiatric: She has a normal mood and affect. Her speech is normal and behavior is normal.      MOST RECENT LABS ON RECORD:   Lab Results   Component Value Date    WBC 7.8 01/28/2020    HGB 11.4 (L) 01/28/2020    HCT 34.9 (L) 01/28/2020     01/28/2020    ALT 8 01/28/2020    AST 14 01/28/2020     01/28/2020    K 3.6 (L) 01/28/2020     01/28/2020    CREATININE 0.66 01/28/2020    BUN 7 01/28/2020    CO2 21 01/28/2020    TSH 1.90 01/05/2016       ASSESSMENT:  1. POTS (postural orthostatic tachycardia syndrome)    2. PSVT (paroxysmal supraventricular tachycardia) ([facility] [address]    3. History of loop recorder       PLAN:    Postural Orthostatic Tachycardia Syndrome (POTS): Currently only mildly intermittently Symptomatic   · Pharmacological Management: Not indicated at this time. · Nonpharmacologic counseling: Because of her condition, I reminded her to try and keep herself well-hydrated and to take extra time when moving from laying to sitting, sitting to standing and standing to walking and not to avoid salt in her diet. · I answered all of her and her mothers questions at this time. · Additional Testing: No testing intervention needed at this time. Paroxysmal Ventricular Tachycardia: Asymptomatic with no known recurrence really since loop recorder.  No other testing and/or treatment likely indicated at this time  · Additional Testing: None      Implantable Loop Recorder Implanted 3/22/2016  Indication for Device Placement: History of a wide complex tachycardia as well as recurrent pre-syncope  · Interrogation Findings: Battery is dead but implant is still implanted. Discussed at length risks/benefits of removal. Agreed to hold off for now but she may reconsider at a later date. Finally, I recommended that she continue her other medications and follow up with you as previously scheduled. FOLLOW UP:   I told Ms. Handley to call my office if she had any problems, but otherwise told her to Return if symptoms worsen or fail to improve. However, I would be happy to see her sooner should the need arise. Once again, thank you for allowing me to participate in this patients care. Please do not hesitate to contact me could I be of further assistance. Sincerely,  Shayla Steven. Jacquelyn BURRELL, MS, F.A.C.C. [facility] Cardiology Specialist, [address] [place] [address]  Phone: [phone], Fax: [phone]     I believe that the risk of significant morbidity and mortality related to the patient's current medical conditions are: low-intermediate. The documentation recorded by the scribe, accurately and completely reflects the services I personally performed and the decisions made by me. Bambi Camarena MD, MS, F.A.C.C.  July 23, 2020

## 2020-12-07 ENCOUNTER — OFFICE VISIT (OUTPATIENT)
Dept: OBGYN | Age: 20
End: 2020-12-07
Payer: COMMERCIAL

## 2020-12-07 ENCOUNTER — HOSPITAL ENCOUNTER (OUTPATIENT)
Age: 20
Setting detail: SPECIMEN
Discharge: HOME | End: 2020-12-07
Payer: COMMERCIAL

## 2020-12-07 VITALS
WEIGHT: 157 LBS | BODY MASS INDEX: 27.82 KG/M2 | DIASTOLIC BLOOD PRESSURE: 76 MMHG | HEIGHT: 63 IN | SYSTOLIC BLOOD PRESSURE: 124 MMHG

## 2020-12-07 PROCEDURE — 87591 N.GONORRHOEAE DNA AMP PROB: CPT

## 2020-12-07 PROCEDURE — 87624 HPV HI-RISK TYP POOLED RSLT: CPT

## 2020-12-07 PROCEDURE — 99395 PREV VISIT EST AGE 18-39: CPT | Performed by: ADVANCED PRACTICE MIDWIFE

## 2020-12-07 PROCEDURE — G0145 SCR C/V CYTO,THINLAYER,RESCR: HCPCS

## 2020-12-07 PROCEDURE — 87491 CHLMYD TRACH DNA AMP PROBE: CPT

## 2020-12-07 ASSESSMENT — PATIENT HEALTH QUESTIONNAIRE - PHQ9
2. FEELING DOWN, DEPRESSED OR HOPELESS: 0
SUM OF ALL RESPONSES TO PHQ QUESTIONS 1-9: 0
SUM OF ALL RESPONSES TO PHQ9 QUESTIONS 1 & 2: 0
SUM OF ALL RESPONSES TO PHQ QUESTIONS 1-9: 0
SUM OF ALL RESPONSES TO PHQ QUESTIONS 1-9: 0
1. LITTLE INTEREST OR PLEASURE IN DOING THINGS: 0

## 2020-12-07 NOTE — PROGRESS NOTES
YEARLY PHYSICAL    Date of service: 2020    Zach Ramirez  Is a 23 y.o.  single female    PT's PCP is: Les Cassidy MD     : 2000                                             Subjective:       Patient's last menstrual period was 2020 (exact date).      Are your menses regular: yes    OB History    Para Term  AB Living   0 0 0 0 0 0   SAB TAB Ectopic Molar Multiple Live Births   0 0 0 0 0 0        Social History     Tobacco Use   Smoking Status Never Smoker   Smokeless Tobacco Never Used        Social History     Substance and Sexual Activity   Alcohol Use No    Alcohol/week: 0.0 standard drinks       Family History   Problem Relation Age of Onset    High Blood Pressure Father     Diabetes Maternal Grandmother     Other Maternal Grandmother         narcolepsy    Cancer Maternal Grandfather     Other Maternal Grandfather         open heart surgery    High Cholesterol Maternal Grandfather     High Blood Pressure Paternal Grandfather        Any family history of breast or ovarian cancer: No    Any family history of blood clots: No      Allergies: Sulfa antibiotics      Current Outpatient Medications:     norgestimate-ethinyl estradiol (SPRINTEC 28) 0.25-35 MG-MCG per tablet, TAKE 1 TABLET BY MOUTH ONCE DAILY, Disp: 28 tablet, Rfl: 12    benzocaine-menthol (CEPACOL SORE THROAT) 15-3.6 MG lozenge, Take 1 lozenge by mouth every 2 hours as needed for Sore Throat (Patient not taking: Reported on 2020), Disp: 20 lozenge, Rfl: 0    ondansetron (ZOFRAN-ODT) 4 MG disintegrating tablet, Take 1 tablet by mouth 3 times daily as needed for Nausea or Vomiting (Patient not taking: Reported on 2020), Disp: 15 tablet, Rfl: 0    Social History     Substance and Sexual Activity   Sexual Activity Yes    Partners: Male    Birth control/protection: Pill    Comment: pill, condoms       Any bleeding or pain with Patient Instructions on file for this visit.     Brionna Samuel 10:29 AM

## 2020-12-08 LAB
CHLAMYDIA BY THIN PREP: NEGATIVE
N. GONORRHOEAE DNA, THIN PREP: NEGATIVE
SPECIMEN DESCRIPTION: NORMAL

## 2020-12-16 LAB — CYTOLOGY REPORT: NORMAL

## 2020-12-18 LAB
HPV SAMPLE: NORMAL
HPV, GENOTYPE 16: NOT DETECTED
HPV, GENOTYPE 18: NOT DETECTED
HPV, HIGH RISK OTHER: NOT DETECTED
HPV, INTERPRETATION: NORMAL
SPECIMEN DESCRIPTION: NORMAL

## 2021-11-23 ENCOUNTER — HOSPITAL ENCOUNTER (OUTPATIENT)
Age: 21
Setting detail: SPECIMEN
Discharge: HOME | End: 2021-11-23
Payer: COMMERCIAL

## 2021-11-23 ENCOUNTER — OFFICE VISIT (OUTPATIENT)
Dept: OBGYN | Age: 21
End: 2021-11-23
Payer: COMMERCIAL

## 2021-11-23 VITALS
BODY MASS INDEX: 26.75 KG/M2 | DIASTOLIC BLOOD PRESSURE: 72 MMHG | WEIGHT: 151 LBS | SYSTOLIC BLOOD PRESSURE: 112 MMHG | HEIGHT: 63 IN

## 2021-11-23 DIAGNOSIS — N88.8 FRIABLE CERVIX: ICD-10-CM

## 2021-11-23 DIAGNOSIS — N93.0 BLEEDING AFTER INTERCOURSE: Primary | ICD-10-CM

## 2021-11-23 DIAGNOSIS — N93.0 BLEEDING AFTER INTERCOURSE: ICD-10-CM

## 2021-11-23 PROCEDURE — 87591 N.GONORRHOEAE DNA AMP PROB: CPT

## 2021-11-23 PROCEDURE — 87491 CHLMYD TRACH DNA AMP PROBE: CPT

## 2021-11-23 PROCEDURE — 99213 OFFICE O/P EST LOW 20 MIN: CPT | Performed by: ADVANCED PRACTICE MIDWIFE

## 2021-11-23 ASSESSMENT — PATIENT HEALTH QUESTIONNAIRE - PHQ9
SUM OF ALL RESPONSES TO PHQ QUESTIONS 1-9: 0
1. LITTLE INTEREST OR PLEASURE IN DOING THINGS: 0
SUM OF ALL RESPONSES TO PHQ9 QUESTIONS 1 & 2: 0
SUM OF ALL RESPONSES TO PHQ QUESTIONS 1-9: 0
2. FEELING DOWN, DEPRESSED OR HOPELESS: 0
SUM OF ALL RESPONSES TO PHQ QUESTIONS 1-9: 0

## 2021-11-23 NOTE — PROGRESS NOTES
breastfeeding. Estimated body mass index is 26.75 kg/m² as calculated from the following:    Height as of this encounter: 5' 3\" (1.6 m). Weight as of this encounter: 151 lb (68.5 kg). PHQ-9 Total Score: 0 (11/23/2021 11:30 AM)      NURSE: WILLIAM    HPI: Patient here today significantly for an episode after vaginal stimulation. Patient states she bled for 3 days and it was fairly heavy. Patient states sometimes she also has bleeding after intercourse    Yes PT denies fever, chills, nausea and vomiting       Objective     Pelvic Exam: GENITAL/URINARY:  External Genitalia:  General appearance; normal, Hair distribution; normal, Lesions absent  Urethra: Fullness absent, Masses absent  Bladder:  Fullness absent, Masses absent, Tenderness absent, Cystocele absent  Vagina:  General appearance normal, Estrogen effect normal, Discharge absent, Lesions absent, Pelvic support normal  Cervix:  General appearance normal, Lesions absent, Discharge absent, Tenderness absent, Enlargement absent, Nodularity absent, friable cervix noted with Q-tip examination                                    Vaginal discharge: no vaginal discharge                        Results reviewed today:    No results found for this visit on 11/23/21. Assessment and Plan          Diagnosis Orders   1. Bleeding after intercourse  C.trachomatis N.gonorrhoeae DNA   2. Friable cervix  C.trachomatis N.gonorrhoeae DNA       If chlamydia normal  And Pap normal  We will do cryo of the cervix      I am having Ermelinda Handley maintain her ondansetron, benzocaine-menthol, and norgestimate-ethinyl estradiol. Return for Patient is scheduled for her yearly exam in December. She was also counseled on her preventative health maintenance recommendations and follow-up. There are no Patient Instructions on file for this visit.     JAVIER Joseph CNM,11/23/2021 12:00 PM

## 2021-11-24 LAB
C TRACH DNA GENITAL QL NAA+PROBE: NEGATIVE
N. GONORRHOEAE DNA: NEGATIVE
SPECIMEN DESCRIPTION: NORMAL

## 2021-12-22 ENCOUNTER — OFFICE VISIT (OUTPATIENT)
Dept: OBGYN | Age: 21
End: 2021-12-22
Payer: COMMERCIAL

## 2021-12-22 ENCOUNTER — HOSPITAL ENCOUNTER (OUTPATIENT)
Age: 21
Setting detail: SPECIMEN
Discharge: HOME | End: 2021-12-22
Payer: COMMERCIAL

## 2021-12-22 VITALS
DIASTOLIC BLOOD PRESSURE: 78 MMHG | HEIGHT: 63 IN | BODY MASS INDEX: 27.11 KG/M2 | SYSTOLIC BLOOD PRESSURE: 118 MMHG | WEIGHT: 153 LBS

## 2021-12-22 DIAGNOSIS — Z11.3 SCREEN FOR STD (SEXUALLY TRANSMITTED DISEASE): ICD-10-CM

## 2021-12-22 DIAGNOSIS — Z01.419 WELL WOMAN EXAM WITH ROUTINE GYNECOLOGICAL EXAM: Primary | ICD-10-CM

## 2021-12-22 DIAGNOSIS — R87.610 ASCUS OF CERVIX WITH NEGATIVE HIGH RISK HPV: ICD-10-CM

## 2021-12-22 DIAGNOSIS — N92.0 MENORRHAGIA WITH REGULAR CYCLE: ICD-10-CM

## 2021-12-22 DIAGNOSIS — Z01.419 WELL WOMAN EXAM WITH ROUTINE GYNECOLOGICAL EXAM: ICD-10-CM

## 2021-12-22 PROCEDURE — 99395 PREV VISIT EST AGE 18-39: CPT | Performed by: ADVANCED PRACTICE MIDWIFE

## 2021-12-22 PROCEDURE — 87591 N.GONORRHOEAE DNA AMP PROB: CPT

## 2021-12-22 PROCEDURE — G0145 SCR C/V CYTO,THINLAYER,RESCR: HCPCS

## 2021-12-22 PROCEDURE — 87624 HPV HI-RISK TYP POOLED RSLT: CPT

## 2021-12-22 PROCEDURE — 87491 CHLMYD TRACH DNA AMP PROBE: CPT

## 2021-12-22 RX ORDER — NORGESTIMATE AND ETHINYL ESTRADIOL 0.25-0.035
KIT ORAL
Qty: 84 TABLET | Refills: 3 | Status: SHIPPED | OUTPATIENT
Start: 2021-12-22 | End: 2022-03-28
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
## 2021-12-22 NOTE — PATIENT INSTRUCTIONS
SURVEY:    You may be receiving a survey regarding your visit today. Please complete the survey to enable us to provide the highest quality of care to you and your family. We strive for all 5's - thank you    If you cannot score us a very good (5) on any question, please call the office to discuss this with Jaclyn Saxena (our ). We would like to discuss how we could of made your experience a very good one. Jaclyn Saxena: [phone]    Thank you.

## 2022-01-04 LAB — CYTOLOGY REPORT: NORMAL

## 2022-01-06 ENCOUNTER — TELEPHONE (OUTPATIENT)
Dept: INFUSION THERAPY | Age: 22
End: 2022-01-06

## 2022-01-28 ENCOUNTER — HOSPITAL ENCOUNTER (OUTPATIENT)
Dept: NON INVASIVE DIAGNOSTICS | Age: 22
Discharge: HOME | End: 2022-01-28
Payer: COMMERCIAL

## 2022-01-28 ENCOUNTER — OFFICE VISIT (OUTPATIENT)
Dept: CARDIOLOGY | Age: 22
End: 2022-01-28
Payer: COMMERCIAL

## 2022-01-28 ENCOUNTER — HOSPITAL ENCOUNTER (OUTPATIENT)
Age: 22
Discharge: HOME | End: 2022-01-28
Payer: COMMERCIAL

## 2022-01-28 VITALS
WEIGHT: 152.6 LBS | BODY MASS INDEX: 27.04 KG/M2 | OXYGEN SATURATION: 98 % | DIASTOLIC BLOOD PRESSURE: 88 MMHG | SYSTOLIC BLOOD PRESSURE: 132 MMHG | HEART RATE: 92 BPM | HEIGHT: 63 IN | RESPIRATION RATE: 18 BRPM

## 2022-01-28 DIAGNOSIS — G90.A POTS (POSTURAL ORTHOSTATIC TACHYCARDIA SYNDROME): ICD-10-CM

## 2022-01-28 DIAGNOSIS — R06.02 SOB (SHORTNESS OF BREATH): ICD-10-CM

## 2022-01-28 DIAGNOSIS — R55 NEAR SYNCOPE: ICD-10-CM

## 2022-01-28 DIAGNOSIS — Z86.16 HISTORY OF COVID-19: ICD-10-CM

## 2022-01-28 DIAGNOSIS — R06.02 SOB (SHORTNESS OF BREATH): Primary | ICD-10-CM

## 2022-01-28 LAB
LV EF: 65 %
LVEF MODALITY: NORMAL
TSH SERPL DL<=0.05 MIU/L-ACNC: 0.86 MIU/L (ref 0.3–5)

## 2022-01-28 PROCEDURE — 99214 OFFICE O/P EST MOD 30 MIN: CPT | Performed by: PHYSICIAN ASSISTANT

## 2022-01-28 PROCEDURE — 93242 EXT ECG>48HR<7D RECORDING: CPT

## 2022-01-28 PROCEDURE — 93306 TTE W/DOPPLER COMPLETE: CPT

## 2022-01-28 PROCEDURE — 84443 ASSAY THYROID STIM HORMONE: CPT

## 2022-01-28 PROCEDURE — 93005 ELECTROCARDIOGRAM TRACING: CPT

## 2022-01-28 PROCEDURE — 36415 COLL VENOUS BLD VENIPUNCTURE: CPT

## 2022-01-28 PROCEDURE — 93000 ELECTROCARDIOGRAM COMPLETE: CPT | Performed by: PHYSICIAN ASSISTANT

## 2022-01-28 PROCEDURE — 93243 EXT ECG>48HR<7D SCAN A/R: CPT

## 2022-01-28 RX ORDER — METOPROLOL SUCCINATE 25 MG/1
25 TABLET, EXTENDED RELEASE ORAL DAILY
Qty: 90 TABLET | Refills: 3 | Status: SHIPPED | OUTPATIENT
Start: 2022-01-28

## 2022-01-28 NOTE — PATIENT INSTRUCTIONS
SURVEY:    You may be receiving a survey from [employer] regarding your visit today. Please complete the survey to enable us to provide the highest quality of care to you and your family. If you cannot score us a very good on any question, please call the office to discuss how we could have made your experience a very good one. Thank you.
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
## 2022-01-31 ENCOUNTER — TELEPHONE (OUTPATIENT)
Dept: CARDIOLOGY | Age: 22
End: 2022-01-31

## 2022-01-31 NOTE — TELEPHONE ENCOUNTER
----- Message from Brice Soliman PA-C sent at 1/31/2022  8:00 AM EST -----  Please let patient know thyroid level was normal and echo looked good. Please ask how her symptoms have been, heart rate?  Thanks

## 2022-01-31 NOTE — TELEPHONE ENCOUNTER
Spoke to Ms. Handley she states symptoms are better since starting Toprol although she is very tired. HR higher 80-90's. With exertion it raises to 100's.

## 2022-02-18 ENCOUNTER — OFFICE VISIT (OUTPATIENT)
Dept: CARDIOLOGY | Age: 22
End: 2022-02-18
Payer: COMMERCIAL

## 2022-02-18 VITALS
OXYGEN SATURATION: 99 % | WEIGHT: 153 LBS | HEIGHT: 63 IN | RESPIRATION RATE: 18 BRPM | BODY MASS INDEX: 27.11 KG/M2 | DIASTOLIC BLOOD PRESSURE: 90 MMHG | HEART RATE: 87 BPM | SYSTOLIC BLOOD PRESSURE: 139 MMHG

## 2022-02-18 DIAGNOSIS — R06.02 SOB (SHORTNESS OF BREATH): ICD-10-CM

## 2022-02-18 DIAGNOSIS — I47.1 PSVT (PAROXYSMAL SUPRAVENTRICULAR TACHYCARDIA) (HCC): ICD-10-CM

## 2022-02-18 DIAGNOSIS — R55 NEAR SYNCOPE: ICD-10-CM

## 2022-02-18 DIAGNOSIS — G90.A POTS (POSTURAL ORTHOSTATIC TACHYCARDIA SYNDROME): Primary | ICD-10-CM

## 2022-02-18 PROCEDURE — 99214 OFFICE O/P EST MOD 30 MIN: CPT | Performed by: FAMILY MEDICINE

## 2022-02-18 RX ORDER — ESCITALOPRAM OXALATE 10 MG/1
10 TABLET ORAL DAILY
Qty: 90 TABLET | Refills: 3 | Status: SHIPPED | OUTPATIENT
Start: 2022-02-18 | End: 2022-05-09

## 2022-02-18 NOTE — PROGRESS NOTES
Rosalind Beavers CMA am scribing for and in the presence of Viktor Jarvis MD, MS, F.A.C.C..    Patient: César Gilbert  : 2000  Date of Visit: 2022    REASON FOR VISIT / CONSULTATION: POTS, Follow-up (hx: 2-3 week f/u. doing okay for the most part, did have an episode last friday at work where in her head and ears she could pass out, gets anxious and heart races. today she is jittery, hasnt eaten yet. Denies: CP, SOB) and Other (has been check bp weekly and it has been good. usually around 112/80. )    Dear Issa Mishra MD,    I had the pleasure of seeing your patient César Gilbert today in follow up. As you know, Ms. Ruby Fuchs is a 24 y.o. female with a history of NSVT as well as neurocardiogenic dysfunction with a history of syncope leading to placement of an implantable loop recorder placement on 3/22/16. She also has a history of postural orthostatic tachycardia syndrome (POTS) that had been treated with Toprol XL 25 mg daily. Because of some symptoms of fatigue she decided to go off her the beta-blocker and had been doing fairly well. Echo done on 2022- EF 65%. CAM results pending. Since the last time I saw Ms. Handley she reports she had an episode last week where she had a funny feeling in her head and felt like she could pass out. This episode was severe, she didn't have chest tightness or shortness of breath. It just made her anxious. This did happen while she was sitting. It lasted for a couple minutes. She said overall she is a little better. The Toprol is making her tired, the first week on it she did feel depressed. She sleeps okay sometimes, she does wake up in the middle of the night. When she wakes up she does feel rested, she does nap when she can. This is the only episode since starting her Metoprolol. She denies any abdominal pain, diarrhea or bleeding problems. She denies any recent falls or near falls. She does not wear compression stockings.  She has been trying to stay hydrated. She did get a home wrist blood pressure monitor to keep track of her blood pressure at home since it has been slightly elevated lately. Past Medical History:   Diagnosis Date    Abnormal Pap smear of cervix     H/O echocardiogram 1/7/16    H/O echocardiogram 1/15/16    Moss treadmill score is 15, which correlates with a low risk for CAD. Overall these results are most consistent with a low risk for significant CAD.  Migraines 2001    Usually right before menstural cycle    Neurocardiogenic syncope 1/01/2016    Status post placement of implantable loop recorder 3/22/16    LINQ Implant-Medtronic     VT (ventricular tachycardia) ([facility] [address] 1/01/2016       CURRENT ALLERGIES: Sulfa antibiotics REVIEW OF SYSTEMS: 14 systems were reviewed. Pertinent positives and negatives as above, all else negative.      Past Surgical History:   Procedure Laterality Date    INSERTABLE CARDIAC MONITOR Left 3/10/16    [facility]/ Dr. Kristi Yeh  03/22/2016    Replacement of LINQ loop recorder secondary to trumatic injury/Dr. Valladares    Social History:  Social History     Tobacco Use    Smoking status: Never Smoker    Smokeless tobacco: Never Used   Vaping Use    Vaping Use: Never used   Substance Use Topics    Alcohol use: No     Alcohol/week: 0.0 standard drinks    Drug use: No        CURRENT MEDICATIONS:  Outpatient Medications Marked as Taking for the 2/18/22 encounter (Office Visit) with Doris Newton MD   Medication Sig Dispense Refill    metoprolol succinate (TOPROL XL) 25 MG extended release tablet Take 1 tablet by mouth daily 90 tablet 3    norgestimate-ethinyl estradiol (SPRINTEC 28) 0.25-35 MG-MCG per tablet TAKE 1 TABLET BY MOUTH EVERY DAY 84 tablet 3     FAMILY HISTORY: family history includes Cancer in her maternal grandfather; Diabetes in her maternal grandmother; High Blood Pressure in her father and paternal grandfather; High Cholesterol in her maternal grandfather; Other in her maternal grandfather and maternal grandmother. PHYSICAL EXAM:   BP (!) 139/90 (Site: Right Upper Arm, Position: Standing, Cuff Size: Medium Adult)   Pulse 87   Resp 18   Ht 5' 2.99\" (1.6 m)   Wt 153 lb (69.4 kg)   SpO2 99%   BMI 27.11 kg/m²  Body mass index is 27.11 kg/m². Constitutional: She is oriented to person, place, and time. She appears well-developed and well-nourished. In no acute distress. HEENT: Normocephalic and atraumatic. No JVD present. Carotid bruit is not present. No mass and no thyromegaly present. No lymphadenopathy present. Cardiovascular: Normal rate, regular rhythm, normal heart sounds. Exam reveals no gallop and no friction rubs. 1/6 systolic murmur, 2nd intercostal space on the RIGHT just lateral to the sternum. Pulmonary/Chest: Effort normal and breath sounds normal. No respiratory distress. She has no wheezes, rhonchi or rales. Abdominal: Soft, non-tender. Bowel sounds and aorta are normal. She exhibits no organomegaly, mass or bruit. Extremities: None. No cyanosis or clubbing. 2+ radial and carotid pulses. Distal extremity pulses: 2+ bilaterally. Neurological: She is alert and oriented to person, place, and time. No evidence of gross cranial nerve deficit. Coordination appeared normal.   Skin: Skin is warm and dry. There is no rash or diaphoresis. Psychiatric: She has a normal mood and affect. Her speech is normal and behavior is normal.      MOST RECENT LABS ON RECORD:   Lab Results   Component Value Date    WBC 7.8 01/28/2020    HGB 11.4 (L) 01/28/2020    HCT 34.9 (L) 01/28/2020     01/28/2020    ALT 8 01/28/2020    AST 14 01/28/2020     01/28/2020    K 3.6 (L) 01/28/2020     01/28/2020    CREATININE 0.66 01/28/2020    BUN 7 01/28/2020    CO2 21 01/28/2020    TSH 0.86 01/28/2022       ASSESSMENT:  1. POTS (postural orthostatic tachycardia syndrome)    2. SOB (shortness of breath)    3. Near syncope    4.  PSVT (paroxysmal supraventricular tachycardia) (HCC)       PLAN:  · Syncope/near syncope of unknown etiology: pressure in her head and ears  · Continue Metoprolol succinate (Toprol XL) 25 mg daily. · Nonpharmacologic counseling: Because of her condition, I reminded her to try and keep herself well-hydrated and to take extra time when moving from laying to sitting, sitting to standing and standing to walking. I also explained to her to help improve her symptoms she should include 3 g sodium diet, 1 or 2 L of sports drinks daily, knee-high compressions stockings. · I will review the results of her CAM monitor and call her with results. Postural Orthostatic Tachycardia Syndrome (POTS): Currently Symptomatic   · Pharmacological Management: I did discuss the option of starting her on Lexapro. In the end she did agree so I will start her on Lexapro 10 mg daily. I told her to stop the medication if she developed worsening depression or anxiety and to call if she felt suicidal. I do not expect that this will be a problem. · Nonpharmacologic counseling: Because of her condition, I reminded her to try and keep herself well-hydrated and to take extra time when moving from laying to sitting, sitting to standing and standing to walking and not to avoid salt in her diet. Paroxysmal Ventricular Tachycardia: Symptomatic     Implantable Loop Recorder Implanted 3/22/2016  Indication for Device Placement: History of a wide complex tachycardia as well as recurrent pre-syncope  Interrogation Findings: Battery is dead but implant is still implanted. Finally, I recommended that she continue her other medications and follow up with you as previously scheduled. FOLLOW UP:   I told Ms. Handley to call my office if she had any problems, but otherwise told her to Return in about 6 weeks (around 4/1/2022). However, I would be happy to see her sooner should the need arise.  Once again, thank you for allowing me to participate in this patients care. Please do not hesitate to contact me could I be of further assistance. Sincerely,  Hubert Ng [facility], [address], [facility], [address] [place] [address]  Phone: [phone], Fax: [phone]     I believe that the risk of significant morbidity and mortality related to the patient's current medical conditions are: Intermediate. The documentation recorded by the scribe, accurately and completely reflects the services I personally performed and the decisions made by me. Rachel Garrett MD, MS, F.A.C.C.  February 18, 2022

## 2022-02-18 NOTE — PATIENT INSTRUCTIONS
SURVEY:    You may be receiving a survey from [employer] regarding your visit today. Please complete the survey to enable us to provide the highest quality of care to you and your family. If you cannot score us a very good on any question, please call the office to discuss how we could have made your experience a very good one. Thank you.

## 2022-02-21 ENCOUNTER — TELEPHONE (OUTPATIENT)
Dept: CARDIOLOGY | Age: 22
End: 2022-02-21

## 2022-02-21 NOTE — PROCEDURES
[address], [place] [address]                                 EVENT MONITOR    PATIENT NAME: Radha Mtz                    :        2000  MED REC NO:   [id number]                              ROOM:  ACCOUNT NO:   [de-identified]                           ADMIT DATE: 2022  PROVIDER:     Aneta Reyna MD    CARDIOVASCULAR DIAGNOSTIC DEPARTMENT    DATE OF STUDY:  2022    ORDERING PROVIDER:  Clementina Nolan PA-C    PRIMARY CARE PROVIDER:  Gisela Moreno MD    INTERPRETING PHYSICIAN: Aneta Reyna MD    DIAGNOSIS:  Tachycardia, unspecified. Other specified cardiac  arrhythmias. PHYSICIAN INTERPRETATION:  Recording Length: 6 days, 21 hours. Findings Summary  1. Predominant rhythm: Normal sinus rhythm. 2. Premature atrial contractions 0.01%.     Sinus tachycardia for 20% of the test duration with an average over  heart rate of 90 bpm.        Laurie Doty MD    D: 2022 11:00:59       T: 2022 11:03:20     [email or web address]  Job#: [id number]     Doc#: Unknown    CC:  Raghav Hernandez PA-C

## 2022-02-21 NOTE — TELEPHONE ENCOUNTER
Called Ms. Handley she states she is doing ok she still has some tiredness, one episode a week ago but over all feeling ok and has follow up in April.

## 2022-02-21 NOTE — TELEPHONE ENCOUNTER
----- Message from Stefano Lebron PA-C sent at 2/21/2022  2:36 PM EST -----  Please notify patient that her CAM showed her predominant rhythm was normal sinus rhythm. She did show sinus tachycardia 20% of the time with average of 90 bpm. Please ask how symptoms have been since last being seen. Thanks.

## 2022-03-28 DIAGNOSIS — N92.0 MENORRHAGIA WITH REGULAR CYCLE: ICD-10-CM

## 2022-03-28 RX ORDER — NORGESTIMATE AND ETHINYL ESTRADIOL 0.25-0.035
KIT ORAL
Qty: 84 TABLET | Refills: 3 | Status: SHIPPED | OUTPATIENT
Start: 2022-03-28

## 2022-05-09 ENCOUNTER — OFFICE VISIT (OUTPATIENT)
Dept: CARDIOLOGY | Age: 22
End: 2022-05-09
Payer: COMMERCIAL

## 2022-05-09 VITALS
HEIGHT: 63 IN | HEART RATE: 80 BPM | WEIGHT: 153.8 LBS | OXYGEN SATURATION: 99 % | RESPIRATION RATE: 16 BRPM | SYSTOLIC BLOOD PRESSURE: 127 MMHG | DIASTOLIC BLOOD PRESSURE: 84 MMHG | BODY MASS INDEX: 27.25 KG/M2

## 2022-05-09 DIAGNOSIS — I47.1 PSVT (PAROXYSMAL SUPRAVENTRICULAR TACHYCARDIA) (HCC): ICD-10-CM

## 2022-05-09 DIAGNOSIS — Z87.898 HISTORY OF SYNCOPE: Primary | ICD-10-CM

## 2022-05-09 DIAGNOSIS — Z98.890 HISTORY OF LOOP RECORDER: ICD-10-CM

## 2022-05-09 DIAGNOSIS — G90.A POTS (POSTURAL ORTHOSTATIC TACHYCARDIA SYNDROME): ICD-10-CM

## 2022-05-09 PROCEDURE — 99213 OFFICE O/P EST LOW 20 MIN: CPT | Performed by: FAMILY MEDICINE

## 2022-05-09 NOTE — PROGRESS NOTES
Gloria Rothman am scribing for and in the presence of Amadou Nogueira. James Ocampo MD, MS, F.A.C.C..    Patient: Pavel Cedeño  : 2000  Date of Visit: May 9, 2022    REASON FOR VISIT / CONSULTATION: POTS, Follow-up (Hx:POTS,SOB,Syncope,PSVT. She has been doing good for the most part. Race at times, randomly or when she is under stress. Denies: CP, SOB, Lightheaded/dizziness. )    Dear Usman Villeda MD,    I had the pleasure of seeing your patient Pavel Cedeño today in follow up. As you know, Ms. Stew Austin is a 24 y.o. female with a history of NSVT as well as neurocardiogenic dysfunction with a history of syncope leading to placement of an implantable loop recorder placement on 3/22/16. She also has a history of postural orthostatic tachycardia syndrome (POTS) that had been treated with Toprol XL 25 mg daily. Because of some symptoms of fatigue she decided to go off her the beta-blocker and had been doing fairly well. Echo done on 2022- EF 65%. CAM done 2022: 6 days, 21 hours. Predominant rhythm: Normal sinus rhythm. Premature atrial contractions 0.01%. Sinus tachycardia for 20% of the test duration with an average over heart rate of 90 bpm.    Since the last time I saw Ms. Handley she reports she has been doing well cardiac wise. She only feels her heart racing when she is stressed out. She never did start the Lexapro because she really would like to stay off medications as much as possible. She denies having any chest pain, pressure or tightness. She denies having any shortness of breath or lightheaded/dizziness. No cough, fever or chills. No nausea or vomiting. No abdominal pain, bleeding problems, bowel issues, problems with medications or any other concerns at this time. Past Medical History:   Diagnosis Date    Abnormal Pap smear of cervix     H/O echocardiogram 16    H/O echocardiogram 1/15/16    Moss treadmill score is 15, which correlates with a low risk for CAD.  Overall these results are most consistent with a low risk for significant CAD.  Migraines 2001    Usually right before menstural cycle    Neurocardiogenic syncope 1/01/2016    Status post placement of implantable loop recorder 3/22/16    LINQ Implant-[employer]tr[employer]     VT (ventricular tachycardia) ([facility] [address] 1/01/2016       CURRENT ALLERGIES: Sulfa antibiotics REVIEW OF SYSTEMS: 14 systems were reviewed. Pertinent positives and negatives as above, all else negative. Past Surgical History:   Procedure Laterality Date    INSERTABLE CARDIAC MONITOR Left 3/10/16    [facility]/ Dr. Kaylah Garcia  03/22/2016    Replacement of LINQ loop recorder secondary to trumatic injury/Dr. Valladares    Social History:  Social History     Tobacco Use    Smoking status: Never Smoker    Smokeless tobacco: Never Used   Vaping Use    Vaping Use: Never used   Substance Use Topics    Alcohol use: No     Alcohol/week: 0.0 standard drinks    Drug use: No        CURRENT MEDICATIONS:  Outpatient Medications Marked as Taking for the 5/9/22 encounter (Office Visit) with Rojelio Rey MD   Medication Sig Dispense Refill    norgestimate-ethinyl estradiol ([address] [address]) 0.25-35 MG-MCG per tablet TAKE 1 TABLET BY MOUTH EVERY DAY 84 tablet 3    metoprolol succinate (TOPROL XL) 25 MG extended release tablet Take 1 tablet by mouth daily 90 tablet 3     FAMILY HISTORY: family history includes Cancer in her maternal grandfather; Diabetes in her maternal grandmother; High Blood Pressure in her father and paternal grandfather; High Cholesterol in her maternal grandfather; Other in her maternal grandfather and maternal grandmother. PHYSICAL EXAM:   /84 (Site: Left Upper Arm, Position: Sitting, Cuff Size: Medium Adult)   Pulse 80   Resp 16   Ht 5' 3\" (1.6 m)   Wt 153 lb 12.8 oz (69.8 kg)   SpO2 99%   BMI 27.24 kg/m²  Body mass index is 27.24 kg/m². Constitutional: She is oriented to person, place, and time.  She appears well-developed and well-nourished. In no acute distress. HEENT: Normocephalic and atraumatic. No JVD present. Carotid bruit is not present. No mass and no thyromegaly present. No lymphadenopathy present. Cardiovascular: Normal rate, regular rhythm, normal heart sounds. Exam reveals no gallop and no friction rubs. 1/6 systolic murmur, 2nd intercostal space on the RIGHT just lateral to the sternum. Pulmonary/Chest: Effort normal and breath sounds normal. No respiratory distress. She has no wheezes, rhonchi or rales. Abdominal: Soft, non-tender. Bowel sounds and aorta are normal. She exhibits no organomegaly, mass or bruit. Extremities: None. No cyanosis or clubbing. 2+ radial and carotid pulses. Distal extremity pulses: 2+ bilaterally. Neurological: She is alert and oriented to person, place, and time. No evidence of gross cranial nerve deficit. Coordination appeared normal.   Skin: Skin is warm and dry. There is no rash or diaphoresis. Psychiatric: She has a normal mood and affect. Her speech is normal and behavior is normal.      MOST RECENT LABS ON RECORD:   Lab Results   Component Value Date    WBC 7.8 01/28/2020    HGB 11.4 (L) 01/28/2020    HCT 34.9 (L) 01/28/2020     01/28/2020    ALT 8 01/28/2020    AST 14 01/28/2020     01/28/2020    K 3.6 (L) 01/28/2020     01/28/2020    CREATININE 0.66 01/28/2020    BUN 7 01/28/2020    CO2 21 01/28/2020    TSH 0.86 01/28/2022     ASSESSMENT:  1. History of syncope    2. POTS (postural orthostatic tachycardia syndrome)    3. PSVT (paroxysmal supraventricular tachycardia) ([facility] [address]    4. History of loop recorder       PLAN:    · Syncope/near syncope of unknown etiology: No further episodes  · Continue Metoprolol succinate (Toprol XL) 25 mg daily.   · Nonpharmacologic counseling: Because of her condition, I reminded her to try and keep herself well-hydrated and to take extra time when moving from laying to sitting, sitting to standing and standing to walking. I also explained to her to help improve her symptoms she should include 3 g sodium diet, 1 or 2 L of sports drinks daily, knee-high compressions stockings. Postural Orthostatic Tachycardia Syndrome (POTS): Currently fairly well controlled   · Nonpharmacologic counseling: Because of her condition, I reminded her to try and keep herself well-hydrated and to take extra time when moving from laying to sitting, sitting to standing and standing to walking and not to avoid salt in her diet. Paroxysmal Ventricular Tachycardia: Symptomatic but currently fairly well controlled. Implantable Loop Recorder Implanted 3/22/2016  Indication for Device Placement: History of a wide complex tachycardia as well as recurrent pre-syncope  Interrogation Findings: Battery is dead but implant is still implanted. Finally, I recommended that she continue her other medications and follow up with you as previously scheduled. FOLLOW UP:   I told Ms. Handley to call my office if she had any problems, but otherwise told her to Return in about 1 year (around 5/9/2023). However, I would be happy to see her sooner should the need arise. Once again, thank you for allowing me to participate in this patients care. Please do not hesitate to contact me could I be of further assistance. Sincerely,    Merlyn Mcnair MD, [address], [facility]    [facility], [address] [place] [address]  Phone: [phone], Fax: [phone]     I believe that the risk of significant morbidity and mortality related to the patient's current medical conditions are: low-intermediate. The documentation recorded by the scribe, accurately and completely reflects the services I personally performed and the decisions made by me. Merlyn Mcnair MD, MS, F.A.C.C.  May 9, 2022

## 2022-05-09 NOTE — PATIENT INSTRUCTIONS
SURVEY:    You may be receiving a survey from [employer] regarding your visit today. Please complete the survey to enable us to provide the highest quality of care to you and your family. If you cannot score us a very good on any question, please call the office to discuss how we could have made your experience a very good one. Thank you.

## 2022-05-20 ENCOUNTER — OFFICE VISIT (OUTPATIENT)
Dept: FAMILY MEDICINE CLINIC | Age: 22
End: 2022-05-20
Payer: COMMERCIAL

## 2022-05-20 VITALS
SYSTOLIC BLOOD PRESSURE: 122 MMHG | OXYGEN SATURATION: 99 % | WEIGHT: 155 LBS | HEIGHT: 63 IN | DIASTOLIC BLOOD PRESSURE: 74 MMHG | BODY MASS INDEX: 27.46 KG/M2

## 2022-05-20 DIAGNOSIS — K58.0 IRRITABLE BOWEL SYNDROME WITH DIARRHEA: ICD-10-CM

## 2022-05-20 DIAGNOSIS — G90.1 DYSAUTONOMIA (HCC): Primary | ICD-10-CM

## 2022-05-20 DIAGNOSIS — D22.9 MULTIPLE BENIGN NEVI: ICD-10-CM

## 2022-05-20 PROBLEM — F43.21 GRIEF REACTION: Status: RESOLVED | Noted: 2017-06-27 | Resolved: 2022-05-20

## 2022-05-20 PROBLEM — L70.0 ACNE VULGARIS: Status: RESOLVED | Noted: 2017-01-16 | Resolved: 2022-05-20

## 2022-05-20 PROBLEM — L30.9 ACUTE DERMATITIS: Status: RESOLVED | Noted: 2018-12-19 | Resolved: 2022-05-20

## 2022-05-20 PROBLEM — L03.90 CELLULITIS: Status: RESOLVED | Noted: 2018-12-19 | Resolved: 2022-05-20

## 2022-05-20 PROCEDURE — 99395 PREV VISIT EST AGE 18-39: CPT | Performed by: FAMILY MEDICINE

## 2022-05-20 SDOH — ECONOMIC STABILITY: FOOD INSECURITY: WITHIN THE PAST 12 MONTHS, YOU WORRIED THAT YOUR FOOD WOULD RUN OUT BEFORE YOU GOT MONEY TO BUY MORE.: NEVER TRUE

## 2022-05-20 SDOH — ECONOMIC STABILITY: FOOD INSECURITY: WITHIN THE PAST 12 MONTHS, THE FOOD YOU BOUGHT JUST DIDN'T LAST AND YOU DIDN'T HAVE MONEY TO GET MORE.: NEVER TRUE

## 2022-05-20 ASSESSMENT — SOCIAL DETERMINANTS OF HEALTH (SDOH): HOW HARD IS IT FOR YOU TO PAY FOR THE VERY BASICS LIKE FOOD, HOUSING, MEDICAL CARE, AND HEATING?: NOT HARD AT ALL

## 2022-05-20 NOTE — PATIENT INSTRUCTIONS
PLAN:  We discuss the loose stools she has been having for a extended length of time. She states she has 5+ loose BMs daily. She has limited / cut out dairy to her diet. She says some foods effect her with in the first 30 minutes of eating. She limits her caffeine intake to 1 cup of coffee in the morning. My recommendations to help control the IBS symptoms   - Watch for triggers   - take Metamucil   - take imodium PRN  - try to decrease stress as much as possible   - try mindful thinking / breathing techniques   - get plenty of sleep 8-10 hours  - limit alcohol intake  - RED flags to watch for: blood in stool, cramping, weight loss.     Call if symptoms worsen or persist.

## 2022-05-20 NOTE — PROGRESS NOTES
Dioni Lakhani [facility], am scribing for and in the presence of Dr. Taj Miller  [address] [facility] [address]  Dept: [phone]    Kennedy Reed is a 24 y.o. female here for Annual Exam and Diarrhea      HPI:  Pt presents for annual wellness    Prior to Admission medications    Medication Sig Start Date End Date Taking? Authorizing Provider   norgestimate-ethinyl estradiol ([address] [address]) 0.25-35 MG-MCG per tablet TAKE 1 TABLET BY MOUTH EVERY DAY 3/28/22  Yes JAVIER Conroy CNM   metoprolol succinate (TOPROL XL) 25 MG extended release tablet Take 1 tablet by mouth daily 1/28/22  Yes Jaime Ramirez PA-C       ROS:  General Constitutional: Denies chills. Denies fever. Denies headache. Denies lightheadedness. Ophthalmologic: Denies blurred vision. ENT: Denies nasal congestion. Denies sore throat. Denies ear pain and pressure. Respiratory: Denies cough. Denies shortness of breath. Denies wheezing. Cardiovascular: Denies chest pain at rest. Denies irregular heartbeat. Denies palpitations. Gastrointestinal: admits abdominal discomfort and bloating  . Denies blood in the stool. Denies constipation. admits diarrhea x5 daily limited dairy for over a year, believes  it to be IBS. Denies nausea. Denies vomiting. Genitourinary: Denies blood in the urine. Denies difficulty urinating. Denies frequent urination. Denies painful urination. Denies urinary incontinence. Musculoskeletal: Denies muscle aches. Denies painful joints. Denies swollen joints. Peripheral Vascular: Denies pain/cramping in legs after exertion. Skin: Denies dry skin. Denies itching. Denies rash. Admits spot on L ear she would like looked at   Neurologic: Denies falls. Denies dizziness. Denies fainting. Denies tingling/numbness. Psychiatric: Denies sleep disturbance. Denies anxiety. Denies depressed mood.     Past Surgical History:   Procedure Laterality Date    INSERTABLE CARDIAC MONITOR Left 3/10/16    [facility]/ Dr. Muna Balderas HISTORY  03/22/2016    Replacement of LINQ loop recorder secondary to trumatic injury/Dr. Valladares       Family History   Problem Relation Age of Onset    High Blood Pressure Father     Diabetes Maternal Grandmother     Other Maternal Grandmother         narcolepsy    Cancer Maternal Grandfather     Other Maternal Grandfather         open heart surgery    High Cholesterol Maternal Grandfather     High Blood Pressure Paternal Grandfather        Past Medical History:   Diagnosis Date    Abnormal Pap smear of cervix     H/O echocardiogram 1/7/16    H/O echocardiogram 1/15/16    Moss treadmill score is 15, which correlates with a low risk for CAD. Overall these results are most consistent with a low risk for significant CAD.  Migraines 2001    Usually right before menstural cycle    Neurocardiogenic syncope 1/01/2016    Status post placement of implantable loop recorder 3/22/16    LINQ Implant-Medtronic     VT (ventricular tachycardia) ([facility] [address] 1/01/2016      Social History     Tobacco Use    Smoking status: Never Smoker    Smokeless tobacco: Never Used   Substance Use Topics    Alcohol use: No     Alcohol/week: 0.0 standard drinks      Current Outpatient Medications   Medication Sig Dispense Refill    norgestimate-ethinyl estradiol (SPRINTEC 28) 0.25-35 MG-MCG per tablet TAKE 1 TABLET BY MOUTH EVERY DAY 84 tablet 3    metoprolol succinate (TOPROL XL) 25 MG extended release tablet Take 1 tablet by mouth daily 90 tablet 3     No current facility-administered medications for this visit.      Allergies   Allergen Reactions    Sulfa Antibiotics Rash and Hives       PHYSICAL EXAM:    /74   Ht 5' 3\" (1.6 m)   Wt 155 lb (70.3 kg)   SpO2 99%   BMI 27.46 kg/m²   Wt Readings from Last 3 Encounters:   05/20/22 155 lb (70.3 kg)   05/09/22 153 lb 12.8 oz (69.8 kg)   02/18/22 153 lb (69.4 kg)     BP Readings from Last 3 Encounters:   05/20/22 122/74   05/09/22 127/84   02/18/22 (!) 139/90       General Appearance: in no acute distress, well developed, well nourished. Eyes: pupils equal, round reactive to light and accommodation. Ears: normal canal and TM's. L sided nevus slightly pigmented raised edge of helix   Nose: nares patent, no lesions. Oral Cavity: mucosa moist.  Throat: clear. 1-2+ tonsils symmetric   Neck/Thyroid: neck supple, full range of motion, no cervical lymphadenopathy, no thyromegaly or carotid bruits. Skin: warm and dry. No suspicious lesions. Raised  4mm nevus symmetric R side of neck above trapezius   Heart: regular rate and rhythm. No murmurs. S1, S2 normal, no gallops. Reg 80  Lungs: clear to auscultation bilaterally. Abdomen: bowel sounds present, soft, nontender, nondistended, no masses or organomegaly. Musculoskeletal: normal, full range of motion in knees and hips, no swelling or tenderness. Reflexes normal   Extremities: no cyanosis or edema. Peripheral Pulses: 2+ throughout, symetric. Neurologic: nonfocal, motor strength normal upper and lower extremities, sensory exam intact. Psych: normal affect, speech fluent. ASSESSMENT:   Diagnosis Orders   1. Dysautonomia ([address]     2. Irritable bowel syndrome with diarrhea     3. Multiple benign nevi         PLAN:  We discuss the loose stools she has been having for a extended length of time. She states she has 5+ loose BMs daily. She has limited / cut out dairy to her diet. She says some foods effect her with in the first 30 minutes of eating. She limits her caffeine intake to 1 cup of coffee in the morning. My recommendations to help control the IBS symptoms   - Watch for triggers   - take Metamucil   - take imodium PRN  - try to decrease stress as much as possible   - try mindful thinking / breathing techniques   - get plenty of sleep 8-10 hours  - limit alcohol intake  - RED flags to watch for: blood in stool, cramping, weight loss.     Call if symptoms worsen or persist.   Strongly advised against any further COVID vaccine in its present form  Servando the m RNA vaccines due to the risk of severe cardiac complications and its infinitesimaly small benefit as she has had covid infection wihtin the past 5 monts    No orders of the defined types were placed in this encounter. No orders of the defined types were placed in this encounter. I, Dr. Pb Gomez, personally performed the services described in this documentation as scribed/transcribed by RONALD Cano in my presence, and is both accurate and complete.

## 2022-12-28 ENCOUNTER — OFFICE VISIT (OUTPATIENT)
Dept: OBGYN | Age: 22
End: 2022-12-28
Payer: COMMERCIAL

## 2022-12-28 ENCOUNTER — HOSPITAL ENCOUNTER (OUTPATIENT)
Age: 22
Setting detail: SPECIMEN
Discharge: HOME | End: 2022-12-28
Payer: COMMERCIAL

## 2022-12-28 VITALS
WEIGHT: 155 LBS | BODY MASS INDEX: 27.46 KG/M2 | DIASTOLIC BLOOD PRESSURE: 70 MMHG | SYSTOLIC BLOOD PRESSURE: 120 MMHG | HEIGHT: 63 IN

## 2022-12-28 DIAGNOSIS — Z11.51 SCREENING FOR HPV (HUMAN PAPILLOMAVIRUS): ICD-10-CM

## 2022-12-28 DIAGNOSIS — N92.0 MENORRHAGIA WITH REGULAR CYCLE: ICD-10-CM

## 2022-12-28 DIAGNOSIS — Z01.419 WELL WOMAN EXAM WITH ROUTINE GYNECOLOGICAL EXAM: ICD-10-CM

## 2022-12-28 DIAGNOSIS — Z01.419 WELL WOMAN EXAM WITH ROUTINE GYNECOLOGICAL EXAM: Primary | ICD-10-CM

## 2022-12-28 DIAGNOSIS — R87.610 ASCUS OF CERVIX WITH NEGATIVE HIGH RISK HPV: ICD-10-CM

## 2022-12-28 PROCEDURE — 87624 HPV HI-RISK TYP POOLED RSLT: CPT

## 2022-12-28 PROCEDURE — 88175 CYTOPATH C/V AUTO FLUID REDO: CPT

## 2022-12-28 PROCEDURE — 99395 PREV VISIT EST AGE 18-39: CPT | Performed by: ADVANCED PRACTICE MIDWIFE

## 2022-12-28 RX ORDER — NORGESTIMATE AND ETHINYL ESTRADIOL 0.25-0.035
KIT ORAL
Qty: 84 TABLET | Refills: 3 | Status: SHIPPED | OUTPATIENT
Start: 2022-12-28

## 2022-12-28 NOTE — PROGRESS NOTES
YEARLY PHYSICAL    Date of service: 2022    Ghazala Hernandez  Is a 20 y.o.  single, female    PT's PCP is: Kristopher Bustos MD     : 2000                                             Subjective:       Patient's last menstrual period was 2022 (exact date).      Are your menses regular: yes    OB History    Para Term  AB Living   0 0 0 0 0 0   SAB IAB Ectopic Molar Multiple Live Births   0 0 0 0 0 0        Social History     Tobacco Use   Smoking Status Never   Smokeless Tobacco Never        Social History     Substance and Sexual Activity   Alcohol Use No    Alcohol/week: 0.0 standard drinks       Family History   Problem Relation Age of Onset    High Blood Pressure Father     Diabetes Maternal Grandmother     Other Maternal Grandmother         narcolepsy    Cancer Maternal Grandfather     Other Maternal Grandfather         open heart surgery    High Cholesterol Maternal Grandfather     High Blood Pressure Paternal Grandfather        Any family history of breast or ovarian cancer: No    Any family history of blood clots: No    Have you had a positive covid test: Yes    Have you had the covid immunization: Yes      Allergies: Sulfa antibiotics      Current Outpatient Medications:     norgestimate-ethinyl estradiol ([address] [address]) 0.25-35 MG-MCG per tablet, TAKE 1 TABLET BY MOUTH EVERY DAY, Disp: 84 tablet, Rfl: 3    Social History     Substance and Sexual Activity   Sexual Activity Yes    Partners: Male    Birth control/protection: Pill    Comment: pill, condoms       Any bleeding or pain with intercourse: No    Last Yearly date:  21    Last pap date and results: 21-Ascus    Last HPV date and results: 21- Neg    Last Mammogram date and results: never    Last Dexa scan date and results: never    Last colorectal screen- type:never  Do you do self breast exams: No    Past Medical History:   Diagnosis Date Abnormal Pap smear of cervix     H/O echocardiogram 1/7/16    H/O echocardiogram 1/15/16    Moss treadmill score is 15, which correlates with a low risk for CAD. Overall these results are most consistent with a low risk for significant CAD. Migraines 2001    Usually right before menstural cycle    Neurocardiogenic syncope 1/01/2016    Status post placement of implantable loop recorder 3/22/16    LINQ Implant-Medtronic     VT (ventricular tachycardia) 1/01/2016       Past Surgical History:   Procedure Laterality Date    INSERT[facility] MONITOR [facility]  [facility]    Replacement of LINQ loop recorder secondary to trumatic injury/Dr. Valladares       Family History   Problem Relation Age of Onset    High Blood Pressure Father     Diabetes Maternal Grandmother     Other Maternal Grandmother         narcolepsy    Cancer Maternal Grandfather     Other Maternal Grandfather         open heart surgery    High Cholesterol Maternal Grandfather     High Blood Pressure Paternal Grandfather        Chief Complaint   Patient presents with    Gynecologic Exam     Pt is here today for yearly exam, pt previous pap was 12/22/21 ascus, hpv neg. Pt has not had any new partners and no symptoms that would require or suggest std testing however pt is ok with doing so if provider finds necessary. PE:  Vital Signs  Blood pressure 120/70, height 5' 3\" (1.6 m), weight 155 lb (70.3 kg), last menstrual period 12/04/2022, not currently breastfeeding. Estimated body mass index is 27.46 kg/m² as calculated from the following:    Height as of this encounter: 5' 3\" (1.6 m). Weight as of this encounter: 155 lb (70.3 kg). Labs:    No results found for this visit on 12/28/22. No data recorded    NURSE: LMD    HPI: Doing well today. Patient denies needs or concerns at this time    Review of Systems   All other systems reviewed and are negative.       Objective  Lymphatic:   no lymphadenopathy  Heent:   negative   Cor: regular rate and rhythm, no murmurs              Pul:clear to auscultation bilaterally- no wheezes, rales or rhonchi, normal air movement, no respiratory distress      GI: Abdomen soft, non-tender. BS normal. No masses,  No organomegaly           Extremities: normal strength, tone, and muscle mass   Breasts: Breast:normal appearance, no masses or tenderness   Pelvic Exam: GENITAL/URINARY:  External Genitalia:  General appearance; normal, Hair distribution; normal, Lesions absent  Urethra: Fullness absent, Masses absent  Bladder:  Fullness absent, Masses absent, Tenderness absent, Cystocele absent  Vagina:  General appearance normal, Estrogen effect normal, Discharge absent, Lesions absent, Pelvic support normal  Cervix:  General appearance normal, Lesions absent, Discharge absent, Tenderness absent, Enlargement absent, Nodularity absent  Uterus:  Size normal, Contour normal, Position normal, Masses absent  Adenexa: Masses absent, Tenderness absent                                    Vaginal discharge: no vaginal discharge                              Assessment and Plan          Diagnosis Orders   1. Well woman exam with routine gynecological exam  PAP SMEAR      2. Screening for HPV (human papillomavirus)  PAP SMEAR      3. ASCUS of cervix with negative high risk HPV  PAP SMEAR      4. Menorrhagia with regular cycle  norgestimate-ethinyl estradiol (SPRINTEC 28) 0.25-35 MG-MCG per tablet                I am having Ermelinda HEATH Handley maintain her norgestimate-ethinyl estradiol. Return in about 1 year (around 12/28/2023) for yearly. She was also counseled on her preventative health maintenance recommendations and follow-up. There are no Patient Instructions on file for this visit.     JAVIER Mendez CNM,12/28/2022 11:13 AM

## 2023-01-10 ENCOUNTER — TELEPHONE (OUTPATIENT)
Dept: OBGYN | Age: 23
End: 2023-01-10

## 2023-05-08 ENCOUNTER — OFFICE VISIT (OUTPATIENT)
Dept: CARDIOLOGY | Age: 23
End: 2023-05-08
Payer: COMMERCIAL

## 2023-05-08 VITALS
RESPIRATION RATE: 18 BRPM | DIASTOLIC BLOOD PRESSURE: 80 MMHG | HEART RATE: 89 BPM | BODY MASS INDEX: 26.58 KG/M2 | WEIGHT: 150 LBS | HEIGHT: 63 IN | SYSTOLIC BLOOD PRESSURE: 117 MMHG

## 2023-05-08 DIAGNOSIS — Z87.898 HISTORY OF SYNCOPE: ICD-10-CM

## 2023-05-08 DIAGNOSIS — I47.1 PSVT (PAROXYSMAL SUPRAVENTRICULAR TACHYCARDIA) (HCC): ICD-10-CM

## 2023-05-08 DIAGNOSIS — G90.A POTS (POSTURAL ORTHOSTATIC TACHYCARDIA SYNDROME): Primary | ICD-10-CM

## 2023-05-08 PROCEDURE — 99214 OFFICE O/P EST MOD 30 MIN: CPT | Performed by: FAMILY MEDICINE

## 2023-05-08 RX ORDER — METOPROLOL SUCCINATE 25 MG/1
25 TABLET, EXTENDED RELEASE ORAL DAILY
COMMUNITY
Start: 2023-03-30

## 2023-05-08 NOTE — PROGRESS NOTES
score is 15, which correlates with a low risk for CAD. Overall these results are most consistent with a low risk for significant CAD. Migraines 2001    Usually right before menstural cycle    Neurocardiogenic syncope 1/01/2016    Status post placement of implantable loop recorder 3/22/16    LINQ Implant-Medtronic     VT (ventricular tachycardia) ([facility] [address] 1/01/2016       CURRENT ALLERGIES: Sulfa antibiotics REVIEW OF SYSTEMS: 14 systems were reviewed. Pertinent positives and negatives as above, all else negative. Past Surgical History:   Procedure Laterality Date    INSERTABLE CARDIAC MONITOR Left 3/10/16    [facility]/ Dr. Agnes Lopez  03/22/2016    Replacement of LINQ loop recorder secondary to trumatic injury/Dr. Valladares    Social History:  Social History     Tobacco Use    Smoking status: Never    Smokeless tobacco: Never   Vaping Use    Vaping Use: Never used   Substance Use Topics    Alcohol use: No     Alcohol/week: 0.0 standard drinks    Drug use: No        CURRENT MEDICATIONS:  Outpatient Medications Marked as Taking for the 5/8/23 encounter (Office Visit) with Kenneth Castellanos MD   Medication Sig Dispense Refill    metoprolol succinate (TOPROL XL) 25 MG extended release tablet Take 1 tablet by mouth daily      norgestimate-ethinyl estradiol ([address] [address]) 0.25-35 MG-MCG per tablet TAKE 1 TABLET BY MOUTH EVERY DAY 84 tablet 3     FAMILY HISTORY: family history includes Cancer in her maternal grandfather; Diabetes in her maternal grandmother; High Blood Pressure in her father and paternal grandfather; High Cholesterol in her maternal grandfather; Other in her maternal grandfather and maternal grandmother. PHYSICAL EXAM:   /80 (Site: Left Upper Arm, Position: Sitting, Cuff Size: Large Adult)   Pulse 89   Resp 18   Ht 5' 3\" (1.6 m)   Wt 150 lb (68 kg)   BMI 26.57 kg/m²  Body mass index is 26.57 kg/m². Constitutional: She is oriented to person, place, and time.  She appears

## 2023-05-24 RX ORDER — SODIUM CHLORIDE 0.9 % (FLUSH) 0.9 %
5-40 SYRINGE (ML) INJECTION PRN
OUTPATIENT
Start: 2023-05-24

## 2023-05-24 RX ORDER — SODIUM CHLORIDE 9 MG/ML
INJECTION, SOLUTION INTRAVENOUS PRN
OUTPATIENT
Start: 2023-05-24

## 2023-05-24 RX ORDER — SODIUM CHLORIDE 0.9 % (FLUSH) 0.9 %
5-40 SYRINGE (ML) INJECTION EVERY 12 HOURS SCHEDULED
OUTPATIENT
Start: 2023-05-24

## 2023-05-25 ENCOUNTER — HOSPITAL ENCOUNTER (OUTPATIENT)
Dept: CARDIAC CATH/INVASIVE PROCEDURES | Age: 23
Discharge: HOME | End: 2023-05-25
Attending: FAMILY MEDICINE | Admitting: FAMILY MEDICINE
Payer: COMMERCIAL

## 2023-05-25 VITALS
OXYGEN SATURATION: 98 % | BODY MASS INDEX: 26.58 KG/M2 | HEIGHT: 63 IN | SYSTOLIC BLOOD PRESSURE: 99 MMHG | DIASTOLIC BLOOD PRESSURE: 54 MMHG | HEART RATE: 85 BPM | RESPIRATION RATE: 19 BRPM | WEIGHT: 150 LBS | TEMPERATURE: 98.6 F

## 2023-05-25 PROBLEM — Z45.09 ENCOUNTER FOR LOOP RECORDER AT END OF BATTERY LIFE: Status: ACTIVE | Noted: 2023-05-25

## 2023-05-25 PROBLEM — R55 RECURRENT SYNCOPE: Status: ACTIVE | Noted: 2023-05-25

## 2023-05-25 PROCEDURE — 2709999900 HC NON-CHARGEABLE SUPPLY

## 2023-05-25 PROCEDURE — 6360000002 HC RX W HCPCS

## 2023-05-25 PROCEDURE — 6360000002 HC RX W HCPCS: Performed by: FAMILY MEDICINE

## 2023-05-25 PROCEDURE — 2500000003 HC RX 250 WO HCPCS

## 2023-05-25 PROCEDURE — 33286 RMVL SUBQ CAR RHYTHM MNTR: CPT | Performed by: FAMILY MEDICINE

## 2023-05-25 PROCEDURE — 33286 RMVL SUBQ CAR RHYTHM MNTR: CPT

## 2023-05-25 RX ORDER — LORAZEPAM 2 MG/ML
1 INJECTION INTRAMUSCULAR ONCE
Status: COMPLETED | OUTPATIENT
Start: 2023-05-25 | End: 2023-05-25

## 2023-05-25 RX ADMIN — LORAZEPAM 1 MG: 2 INJECTION INTRAMUSCULAR; INTRAVENOUS at 14:41

## 2023-05-25 NOTE — OP NOTE
OPERATIVE REPORT    Patient: Len Izquierdo   YOB: 2000       Attending: Woodroe Mohs, M.D. Date of Surgery: 5/25/2023   PCP Phys: Leo Nowak MD       SURGEON:  Woodroe Mohs, M.D. PROCEDURE:  Medtronic LINQ loop recorder removal    INDICATION:  Recurrent syncope       NARRATIVE SUMMARY:  Ms. Nilam Chen was brought to the pre-operative waiting room and after explaining the risks, benefits and alternatives of the procedure and informed written consent was obtained. The patient was prepped and draped in the standard surgical fashion. After adequate sedation Marcaine was used to anesthetize the area over the previously placed loop recorder. An very small incision was made over the old device and using a small curved hemostat, the old loop recorder was removed without complication. Finally, the pocket was closed with demabond. Overall the patient tolerated the procedure well and there were no complications. Blood loss was minimal.    EXPLANTED DEVICE DETAIL:  Medtronic REVEAL LINQ loop recorder, [id number], See scanned in Cath lab material    Estimated Blood Loss: Less than 5 ml    Hitesh Valladares MD, MS, F.A.C.C.   [facility]  [address] [place] [address], [address] [place] [address]  Phone: [phone], Fax: [phone]     Electronically signed by Valerie Leigh MD on 5/25/2023 at 3:23 PM

## 2023-05-25 NOTE — DISCHARGE INSTRUCTIONS
DISCHARGE INSTRUCTIONS FOR LINQ loop recorder removal    HOME CARE:    You will likely go home with steri strips/surgical glue over your incision. You may also have sutures internally that do not need removed. Surgical dressing may be removed in 24 hours and changed daily until no   drainage is noted from site. Keep the incision clean and dry. Steri strips will fall off within 1-2 weeks. Do not take shower for 24 hours. Tylenol may be used for relief of tenderness around the implant site. Apply ice to site for 10-15 minutes every hour as needed for pain and swelling. Also avoid anything that will rub against the incision. CHANGES TO NOTIFY OUR OFFICE ABOUT:   Increased swelling and/or tenderness. Increased redness of the pocket or incision. Drainage from the incision. A pimple that develops along the incision. You develop a fever and do not have a cold or the flu. Notify the doctor if you experience the symptoms you had prior to implant. Avoid activities that may result in high impact or stress at the incision site. DRIVING IS USUALLY PERMITTED WITHIN 24 hours. IF YOU ARE ON BLOOD THINNERS, CHECK WITH YOUR DOCTOR WHEN TO RESUME YOUR MEDICATION.

## 2023-05-25 NOTE — PROGRESS NOTES
Returned from procedure area, report received from [facility], dressing to upper chest clean, dry, and intact, family at bedside

## 2023-10-10 DIAGNOSIS — N92.0 MENORRHAGIA WITH REGULAR CYCLE: ICD-10-CM

## 2023-10-10 RX ORDER — NORGESTIMATE AND ETHINYL ESTRADIOL 0.25-0.035
KIT ORAL
Qty: 84 TABLET | Refills: 0 | Status: SHIPPED | OUTPATIENT
Start: 2023-10-10